# Patient Record
Sex: MALE | Race: WHITE | NOT HISPANIC OR LATINO | URBAN - METROPOLITAN AREA
[De-identification: names, ages, dates, MRNs, and addresses within clinical notes are randomized per-mention and may not be internally consistent; named-entity substitution may affect disease eponyms.]

---

## 2023-07-10 ASSESSMENT — ENCOUNTER SYMPTOMS
COLOR CHANGE: 0
FACIAL SWELLING: 0
COUGH: 0
FEVER: 0
WHEEZING: 0
CONFUSION: 0
PALPITATIONS: 0
DIARRHEA: 0
ARTHRALGIAS: 0
CONSTIPATION: 0
EYE DISCHARGE: 0
ACTIVITY CHANGE: 0
CHILLS: 0
APPETITE CHANGE: 0

## 2023-07-10 NOTE — PROGRESS NOTES
"Subjective   Patient ID: Humphrey Villarreal is a 43 y.o. male who presents for New patient to establish .    HPI   Patient presents to [facility].  Patient reports that he has been having sinus pressure congestion and drainage.  Wife is sick with similar symptoms.    Fam Hx  Mom () living,   Dad () living, hypothyroidism, pacer, dehydration, HTN, esophageal varices banded, GERD  Sister () living, GERD    Exercise walks, golfs  ETOH once a week  Caffeine two 8 oz cups/day, rarely a soda  Tobacco denies, smoked social as a kid    Colonoscopy Dr. Quick 2023    Past Med Hx  Asthma  GERD   Hypertension   Allergic rhinitis    Past Surg Hx  Lumbar fusion  Intuessception as a toddler    Patient denies other complaints.    Review of Systems   Constitutional:  Negative for activity change, appetite change, chills and fever.   HENT:  Negative for congestion, ear pain and facial swelling.    Eyes:  Negative for discharge.   Respiratory:  Negative for cough and wheezing.    Cardiovascular:  Negative for chest pain, palpitations and leg swelling.   Gastrointestinal:  Negative for constipation and diarrhea.   Musculoskeletal:  Negative for arthralgias.   Skin:  Negative for color change and pallor.   Neurological:  Negative for syncope.   Psychiatric/Behavioral:  Negative for confusion.        Objective   /86 (BP Location: Left arm)   Pulse 89   Ht 1.822 m (5' 11.75\")   Wt 117 kg (258 lb 12.8 oz)   BMI 35.34 kg/m²   BSA Body surface area is 2.43 meters squared.      Physical Exam  Constitutional:       General: He is not in acute distress.     Appearance: Normal appearance. He is not toxic-appearing.   HENT:      Head: Normocephalic.      Right Ear: Tympanic membrane, ear canal and external ear normal.      Left Ear: Tympanic membrane, ear canal and external ear normal.      Nose: Nose normal.      Mouth/Throat:      Pharynx: Oropharynx is clear.   Eyes:      Conjunctiva/sclera: Conjunctivae normal.      Pupils: Pupils are " equal, round, and reactive to light.   Cardiovascular:      Rate and Rhythm: Normal rate and regular rhythm.      Pulses: Normal pulses.      Heart sounds: Normal heart sounds.   Pulmonary:      Effort: No respiratory distress.      Breath sounds: No wheezing, rhonchi or rales.   Abdominal:      General: Bowel sounds are normal. There is no distension.      Palpations: Abdomen is soft.      Tenderness: There is no abdominal tenderness.   Musculoskeletal:         General: No swelling or tenderness.      Cervical back: No tenderness.   Skin:     Findings: No lesion or rash.   Neurological:      General: No focal deficit present.      Mental Status: He is alert and oriented to person, place, and time. Mental status is at baseline.      Gait: Gait normal.   Psychiatric:         Mood and Affect: Mood normal.         Behavior: Behavior normal.         Thought Content: Thought content normal.         Judgment: Judgment normal.       No visits with results within 1 Year(s) from this visit.   Latest known visit with results is:   [facility] Encounter on 08/19/2015   Component Date Value Ref Range Status    Ventricular Rate 08/19/2015 90  BPM Final    Atrial Rate 08/19/2015 90  BPM Final    IA Interval 08/19/2015 148  ms Final    QRS Duration 08/19/2015 90  ms Final    QT Interval 08/19/2015 354  ms Final    QTC Calculation(Bazett) 08/19/2015 433  ms Final    P Axis 08/19/2015 33  degrees Final    R Axis 08/19/2015 43  degrees Final    T Axis 08/19/2015 35  degrees Final    QRS Count 08/19/2015 15  beats Final    Q Onset 08/19/2015 216  ms Final    P Onset 08/19/2015 142  ms Final    P Offset 08/19/2015 199  ms Final    T Offset 08/19/2015 393  ms Final    QTC Fredericia 08/19/2015 405  ms Final     Current Outpatient Medications on File Prior to Visit   Medication Sig Dispense Refill    esomeprazole (NexIUM) 40 mg DR capsule Take 1 capsule (40 mg) by mouth once daily in the morning. Take before meals. Do not open capsule.       metoprolol succinate XL (Toprol-XL) 100 mg 24 hr tablet Take 1 tablet (100 mg) by mouth once daily.      triamterene-hydrochlorothiazid (Maxzide-25) 37.5-25 mg tablet TAKE 1 TABLET BY MOUTH IN THE MORNING ONCE DAILY      valsartan (Diovan) 160 mg tablet        No current facility-administered medications on file prior to visit.     No images are attached to the encounter.            Assessment/Plan   Diagnoses and all orders for this visit:  Acute non-recurrent frontal sinusitis  -     azithromycin (Zithromax) 250 mg tablet; Take 2 tablets (500 mg) by mouth once daily for 1 day, THEN 1 tablet (250 mg) once daily for 4 days. Take 2 tabs (500 mg) by mouth today, than 1 daily for 4 days..  Asthma, unspecified asthma severity, unspecified whether complicated, unspecified whether persistent  Gastroesophageal reflux disease without esophagitis  Primary hypertension  Allergic rhinitis, unspecified seasonality, unspecified trigger    Patient have additional blood work performed  Patient to schedule physical exam at his earliest convenience  Patient to call for questions or concerns

## 2023-07-11 ENCOUNTER — OFFICE VISIT (OUTPATIENT)
Dept: PRIMARY CARE | Facility: CLINIC | Age: 43
End: 2023-07-11
Payer: COMMERCIAL

## 2023-07-11 VITALS
HEIGHT: 72 IN | HEART RATE: 89 BPM | BODY MASS INDEX: 35.05 KG/M2 | WEIGHT: 258.8 LBS | DIASTOLIC BLOOD PRESSURE: 86 MMHG | SYSTOLIC BLOOD PRESSURE: 140 MMHG

## 2023-07-11 DIAGNOSIS — I10 PRIMARY HYPERTENSION: ICD-10-CM

## 2023-07-11 DIAGNOSIS — Z00.00 HEALTHCARE MAINTENANCE: ICD-10-CM

## 2023-07-11 DIAGNOSIS — J45.909 ASTHMA, UNSPECIFIED ASTHMA SEVERITY, UNSPECIFIED WHETHER COMPLICATED, UNSPECIFIED WHETHER PERSISTENT (HHS-HCC): ICD-10-CM

## 2023-07-11 DIAGNOSIS — K86.2 PANCREATIC CYST (HHS-HCC): ICD-10-CM

## 2023-07-11 DIAGNOSIS — K76.0 HEPATIC STEATOSIS: ICD-10-CM

## 2023-07-11 DIAGNOSIS — K21.9 GASTROESOPHAGEAL REFLUX DISEASE WITHOUT ESOPHAGITIS: ICD-10-CM

## 2023-07-11 DIAGNOSIS — J01.10 ACUTE NON-RECURRENT FRONTAL SINUSITIS: Primary | ICD-10-CM

## 2023-07-11 DIAGNOSIS — J30.9 ALLERGIC RHINITIS, UNSPECIFIED SEASONALITY, UNSPECIFIED TRIGGER: ICD-10-CM

## 2023-07-11 DIAGNOSIS — R10.9 ABDOMINAL PAIN, UNSPECIFIED ABDOMINAL LOCATION: ICD-10-CM

## 2023-07-11 PROCEDURE — 3077F SYST BP >= 140 MM HG: CPT | Performed by: FAMILY MEDICINE

## 2023-07-11 PROCEDURE — 1036F TOBACCO NON-USER: CPT | Performed by: FAMILY MEDICINE

## 2023-07-11 PROCEDURE — 3079F DIAST BP 80-89 MM HG: CPT | Performed by: FAMILY MEDICINE

## 2023-07-11 PROCEDURE — 99204 OFFICE O/P NEW MOD 45 MIN: CPT | Performed by: FAMILY MEDICINE

## 2023-07-11 RX ORDER — AZITHROMYCIN 250 MG/1
TABLET, FILM COATED ORAL
Qty: 6 TABLET | Refills: 0 | Status: SHIPPED | OUTPATIENT
Start: 2023-07-11 | End: 2023-07-16

## 2023-07-11 RX ORDER — METOPROLOL SUCCINATE 100 MG/1
100 TABLET, EXTENDED RELEASE ORAL DAILY
COMMUNITY
Start: 2023-06-06 | End: 2024-02-13 | Stop reason: SDUPTHER

## 2023-07-11 RX ORDER — TRIAMTERENE/HYDROCHLOROTHIAZID 37.5-25 MG
TABLET ORAL
COMMUNITY
Start: 2023-06-15 | End: 2024-02-13 | Stop reason: SDUPTHER

## 2023-07-11 RX ORDER — VALSARTAN 160 MG/1
TABLET ORAL
COMMUNITY
Start: 2023-06-07 | End: 2024-02-13 | Stop reason: SDUPTHER

## 2023-07-11 RX ORDER — ESOMEPRAZOLE MAGNESIUM 40 MG/1
40 CAPSULE, DELAYED RELEASE ORAL
COMMUNITY

## 2023-08-03 ENCOUNTER — LAB (OUTPATIENT)
Dept: LAB | Facility: LAB | Age: 43
End: 2023-08-03
Payer: COMMERCIAL

## 2023-08-03 DIAGNOSIS — Z00.00 HEALTHCARE MAINTENANCE: ICD-10-CM

## 2023-08-03 DIAGNOSIS — R73.01 ELEVATED FASTING GLUCOSE: ICD-10-CM

## 2023-08-03 DIAGNOSIS — R10.9 ABDOMINAL PAIN, UNSPECIFIED ABDOMINAL LOCATION: ICD-10-CM

## 2023-08-03 DIAGNOSIS — D69.6 THROMBOCYTOPENIA (CMS-HCC): ICD-10-CM

## 2023-08-03 DIAGNOSIS — J30.9 ALLERGIC RHINITIS, UNSPECIFIED SEASONALITY, UNSPECIFIED TRIGGER: ICD-10-CM

## 2023-08-03 LAB
ALANINE AMINOTRANSFERASE (SGPT) (U/L) IN SER/PLAS: 42 U/L (ref 10–52)
ALBUMIN (G/DL) IN SER/PLAS: 4.5 G/DL (ref 3.4–5)
ALKALINE PHOSPHATASE (U/L) IN SER/PLAS: 47 U/L (ref 33–120)
AMYLASE (U/L) IN SER/PLAS: 26 U/L (ref 29–103)
ANION GAP IN SER/PLAS: 13 MMOL/L (ref 10–20)
ASPARTATE AMINOTRANSFERASE (SGOT) (U/L) IN SER/PLAS: 26 U/L (ref 9–39)
BASOPHILS (10*3/UL) IN BLOOD BY AUTOMATED COUNT: 0.03 X10E9/L (ref 0–0.1)
BASOPHILS/100 LEUKOCYTES IN BLOOD BY AUTOMATED COUNT: 0.6 % (ref 0–2)
BILIRUBIN TOTAL (MG/DL) IN SER/PLAS: 0.6 MG/DL (ref 0–1.2)
CALCIUM (MG/DL) IN SER/PLAS: 9.2 MG/DL (ref 8.6–10.6)
CARBON DIOXIDE, TOTAL (MMOL/L) IN SER/PLAS: 30 MMOL/L (ref 21–32)
CHLORIDE (MMOL/L) IN SER/PLAS: 100 MMOL/L (ref 98–107)
CHOLESTEROL (MG/DL) IN SER/PLAS: 145 MG/DL (ref 0–199)
CHOLESTEROL IN HDL (MG/DL) IN SER/PLAS: 32 MG/DL
CHOLESTEROL/HDL RATIO: 4.5
CREATININE (MG/DL) IN SER/PLAS: 0.85 MG/DL (ref 0.5–1.3)
EOSINOPHILS (10*3/UL) IN BLOOD BY AUTOMATED COUNT: 0.1 X10E9/L (ref 0–0.7)
EOSINOPHILS/100 LEUKOCYTES IN BLOOD BY AUTOMATED COUNT: 1.9 % (ref 0–6)
ERYTHROCYTE DISTRIBUTION WIDTH (RATIO) BY AUTOMATED COUNT: 12.4 % (ref 11.5–14.5)
ERYTHROCYTE MEAN CORPUSCULAR HEMOGLOBIN CONCENTRATION (G/DL) BY AUTOMATED: 33.3 G/DL (ref 32–36)
ERYTHROCYTE MEAN CORPUSCULAR VOLUME (FL) BY AUTOMATED COUNT: 95 FL (ref 80–100)
ERYTHROCYTES (10*6/UL) IN BLOOD BY AUTOMATED COUNT: 5 X10E12/L (ref 4.5–5.9)
GFR MALE: >90 ML/MIN/1.73M2
GLUCOSE (MG/DL) IN SER/PLAS: 110 MG/DL (ref 74–99)
HEMATOCRIT (%) IN BLOOD BY AUTOMATED COUNT: 47.4 % (ref 41–52)
HEMOGLOBIN (G/DL) IN BLOOD: 15.8 G/DL (ref 13.5–17.5)
IMMATURE GRANULOCYTES/100 LEUKOCYTES IN BLOOD BY AUTOMATED COUNT: 0.4 % (ref 0–0.9)
LDL: 56 MG/DL (ref 0–99)
LEUKOCYTES (10*3/UL) IN BLOOD BY AUTOMATED COUNT: 5.1 X10E9/L (ref 4.4–11.3)
LIPASE (U/L) IN SER/PLAS: 28 U/L (ref 9–82)
LYMPHOCYTES (10*3/UL) IN BLOOD BY AUTOMATED COUNT: 1.09 X10E9/L (ref 1.2–4.8)
LYMPHOCYTES/100 LEUKOCYTES IN BLOOD BY AUTOMATED COUNT: 21.2 % (ref 13–44)
MONOCYTES (10*3/UL) IN BLOOD BY AUTOMATED COUNT: 0.5 X10E9/L (ref 0.1–1)
MONOCYTES/100 LEUKOCYTES IN BLOOD BY AUTOMATED COUNT: 9.7 % (ref 2–10)
NEUTROPHILS (10*3/UL) IN BLOOD BY AUTOMATED COUNT: 3.4 X10E9/L (ref 1.2–7.7)
NEUTROPHILS/100 LEUKOCYTES IN BLOOD BY AUTOMATED COUNT: 66.2 % (ref 40–80)
NON HDL CHOLESTEROL: 113 MG/DL
NRBC (PER 100 WBCS) BY AUTOMATED COUNT: 0 /100 WBC (ref 0–0)
PLATELETS (10*3/UL) IN BLOOD AUTOMATED COUNT: 146 X10E9/L (ref 150–450)
POTASSIUM (MMOL/L) IN SER/PLAS: 3.7 MMOL/L (ref 3.5–5.3)
PROTEIN TOTAL: 6.7 G/DL (ref 6.4–8.2)
SODIUM (MMOL/L) IN SER/PLAS: 139 MMOL/L (ref 136–145)
THYROTROPIN (MIU/L) IN SER/PLAS BY DETECTION LIMIT <= 0.05 MIU/L: 0.92 MIU/L (ref 0.44–3.98)
TRIGLYCERIDE (MG/DL) IN SER/PLAS: 287 MG/DL (ref 0–149)
UREA NITROGEN (MG/DL) IN SER/PLAS: 16 MG/DL (ref 6–23)
VLDL: 57 MG/DL (ref 0–40)

## 2023-08-03 PROCEDURE — 84443 ASSAY THYROID STIM HORMONE: CPT

## 2023-08-03 PROCEDURE — 86003 ALLG SPEC IGE CRUDE XTRC EA: CPT

## 2023-08-03 PROCEDURE — 82785 ASSAY OF IGE: CPT

## 2023-08-03 PROCEDURE — 85025 COMPLETE CBC W/AUTO DIFF WBC: CPT

## 2023-08-03 PROCEDURE — 36415 COLL VENOUS BLD VENIPUNCTURE: CPT

## 2023-08-03 PROCEDURE — 83690 ASSAY OF LIPASE: CPT

## 2023-08-03 PROCEDURE — 80061 LIPID PANEL: CPT

## 2023-08-03 PROCEDURE — 80053 COMPREHEN METABOLIC PANEL: CPT

## 2023-08-03 PROCEDURE — 82150 ASSAY OF AMYLASE: CPT

## 2023-08-04 LAB
ALLERGEN ANIMAL: CAT DANDER IGE (KU/L): <0.1 KU/L
ALLERGEN ANIMAL: DOG DANDER IGE (KU/L): <0.1 KU/L
ALLERGEN FOOD: CLAM (RUDITAPES SPP.) IGE (KU/L): <0.1 KU/L
ALLERGEN FOOD: EGG WHITE IGE (KU/L): <0.1 KU/L
ALLERGEN FOOD: FISH (COD) GADUS MORHUA) IGE (KU/L): <0.1 KU/L
ALLERGEN FOOD: MAIZE, CORN (ZEA MAYS) IGE (KU/L): <0.1 KU/L
ALLERGEN FOOD: MILK IGE (KU/L): <0.1 KU/L
ALLERGEN FOOD: PEANUT (ARACHIS HYPOGAEA) IGE (KU/L): <0.1 KU/L
ALLERGEN FOOD: SCALLOP (PECTEN SPP.) IGE (KU/L): <0.1 KU/L
ALLERGEN FOOD: SESAME SEED (SESAMUM INDICUM) IGE (KU/L): <0.1 KU/L
ALLERGEN FOOD: SHRIMP (P. BOREALIS/MONODON, M. BARBATA/JOYNERI) IGE (KU/L): <0.1 KU/L
ALLERGEN FOOD: SOYBEAN (GLYCINE MAX) IGE (KU/L): <0.1 KU/L
ALLERGEN FOOD: WALNUT (JUGLANS SPP.) IGE (KU/L): <0.1 KU/L
ALLERGEN FOOD: WHEAT (TRITICUM AESTIVUM) IGE (KU/L): <0.1 KU/L
ALLERGEN GRASS: BERMUDA GRASS (CYNODON DACTYLON) IGE (KU/L): <0.1 KU/L
ALLERGEN GRASS: JOHNSON GRASS (SORGHUM HALEPENSE) IGE (KU/L): <0.1 KU/L
ALLERGEN GRASS: MEADOW GRASS, KENTUCKY BLUE (POA PRATENSIS )IGE (KU/L): <0.1 KU/L
ALLERGEN GRASS: TIMOTHY GRASS (PHLEUM PRATENSE) IGE (KU/L): <0.1 KU/L
ALLERGEN INSECT: COCKROACH IGE: <0.1 KU/L
ALLERGEN MICROORGANISM: ALTERNARIA ALTERNATA IGE (KU/L): <0.1 KU/L
ALLERGEN MICROORGANISM: ASPERGILLUS FUMIGATUS IGE (KU/L): <0.1 KU/L
ALLERGEN MICROORGANISM: CLADOSPORIUM HERBARUM IGE (KU/L): <0.1 KU/L
ALLERGEN MICROORGANISM: PENICILLIUM CHRYSOGENUM (P. NOTATUM) IGE (KU/L): <0.1 KU/L
ALLERGEN MITE: DERMATOPHAGOIDES FARINAE (HOUSE DUST MITE) IGE (KU/L): 0.14 KU/L
ALLERGEN MITE: DERMATOPHAGOIDES PTERONYSSINUS (HOUSE DUST MITE) IGE (KU/L): 0.17 KU/L
ALLERGEN TREE: BOX-ELDER (ACER NEGUNDO) IGE (KU/L): 0.12 KU/L
ALLERGEN TREE: COMMON SILVER BIRCH (BETULA VERRUCOSA) IGE (KU/L): 0.5 KU/L
ALLERGEN TREE: COTTONWOOD (POPULUS DELTOIDES) IGE (KU/L): <0.1 KU/L
ALLERGEN TREE: ELM (ULMUS AMERICANA) IGE (KU/L): <0.1 KU/L
ALLERGEN TREE: MAPLE LEAF SYCAMORE, LONDON PLANE IGE (KU/L): <0.1 KU/L
ALLERGEN TREE: MOUNTAIN JUNIPER (JUNIPERUS SABINOIDES) IGE (KU/L): <0.1 KU/L
ALLERGEN TREE: MULBERRY (MORUS ALBA) IGE (KU/L): <0.1 KU/L
ALLERGEN TREE: OAK (QUERCUS ALBA) IGE (KU/L): 0.41 KU/L
ALLERGEN TREE: PECAN, HICKORY (CARYA PECAN) IGE (KU/L): 0.27 KU/L
ALLERGEN TREE: WALNUT IGE: 0.16 KU/L
ALLERGEN TREE: WHITE ASH (FRAXINUS AMERICANA) IGE (KU/L): <0.1 KU/L
ALLERGEN WEED: COMMON PIGWEED (AMARANTHUS RETROFLEXUS) IGE (KU/L): <0.1 KU/L
ALLERGEN WEED: COMMON RAGWEED (AMB. ARTEMISIIFOLIA/A. ELATIOR) IGE (KU/L): <0.1 KU/L
ALLERGEN WEED: GOOSEFOOT, LAMB'S QUARTERS (CHENOPODIUM ALBUM) IGE (KU/L): <0.1 KU/L
ALLERGEN WEED: PLANTAIN (ENGLISH), RIBWORT (PLANTAGO LANCEOLATA) IGE (KU/L): <0.1 KU/L
ALLERGEN WEED: PRICKLY SALTWORT/RUSSIAN THISTLE (SALSOLA KALI) IGE (KU/L): <0.1 KU/L
ALLERGEN WEED: SHEEP SORREL (RUMEX ACETOSELLA) IGE (KU/L): <0.1 KU/L
IMMUNOCAP IGE: 19.9 KU/L (ref 0–214)
IMMUNOCAP INTERPRETATION: ABNORMAL
IMMUNOCAP INTERPRETATION: NORMAL

## 2023-09-25 ENCOUNTER — TELEMEDICINE (OUTPATIENT)
Dept: PRIMARY CARE | Facility: CLINIC | Age: 43
End: 2023-09-25
Payer: COMMERCIAL

## 2023-09-25 DIAGNOSIS — J06.9 ACUTE URI: Primary | ICD-10-CM

## 2023-09-25 PROCEDURE — 99214 OFFICE O/P EST MOD 30 MIN: CPT | Performed by: FAMILY MEDICINE

## 2023-09-25 RX ORDER — AZITHROMYCIN 250 MG/1
TABLET, FILM COATED ORAL
Qty: 6 TABLET | Refills: 0 | Status: SHIPPED | OUTPATIENT
Start: 2023-09-25 | End: 2023-09-30

## 2023-09-25 ASSESSMENT — ENCOUNTER SYMPTOMS
PALPITATIONS: 0
ACTIVITY CHANGE: 0
COUGH: 0
FACIAL SWELLING: 0
COLOR CHANGE: 0
CONSTIPATION: 0
CHILLS: 1
FEVER: 1
WHEEZING: 0
EYE DISCHARGE: 0
CONFUSION: 0
APPETITE CHANGE: 0
HEADACHES: 1
DIARRHEA: 0
FATIGUE: 1
ARTHRALGIAS: 0
MYALGIAS: 1

## 2023-09-25 NOTE — PROGRESS NOTES
Subjective   Patient ID: Humphrey Villarreal is a 43 y.o. male who presents for Fever with headache.  (Body aches, chills, sweating. X yesterday. Neg for covid today. ).    HPI   Patient reports he has been having difficulty with fever headache body aches chills sweating since yesterday patient reports he took a COVID test and it was negative.  Patient denies any chest pain shortness of breath syncopal episodes or palpitations.  Review of Systems   Constitutional:  Positive for chills, fatigue and fever. Negative for activity change and appetite change.   HENT:  Negative for congestion, ear pain and facial swelling.    Eyes:  Negative for discharge.   Respiratory:  Negative for cough and wheezing.    Cardiovascular:  Negative for chest pain, palpitations and leg swelling.   Gastrointestinal:  Negative for constipation and diarrhea.   Musculoskeletal:  Positive for myalgias. Negative for arthralgias.   Skin:  Negative for color change and pallor.   Neurological:  Positive for headaches. Negative for syncope.   Psychiatric/Behavioral:  Negative for confusion.        Objective   There were no vitals taken for this visit.  BSA There is no height or weight on file to calculate BSA.      Physical Exam  Lab on 08/03/2023   Component Date Value Ref Range Status    Amylase 08/03/2023 26 (L)  29 - 103 U/L Final    Lipase 08/03/2023 28  9 - 82 U/L Final     Venipuncture immediately after or during the    administration of Metamizole may lead to falsely   low results. Testing should be performed immediately   prior to Metamizole dosing.    Clam IgE 08/03/2023 <0.10  <0.35 KU/L Final      SEE IMMUNOCAP INTERP.IGE     Fish (Cod) IgE 08/03/2023 <0.10  <0.35 KU/L Final      SEE IMMUNOCAP INTERP.IGE     [place], Corn IgE 08/03/2023 <0.10  <0.35 KU/L Final      SEE IMMUNOCAP INTERP.IGE     Egg White IgE 08/03/2023 <0.10  <0.35 KU/L Final      SEE IMMUNOCAP INTERP.IGE     Milk IgE 08/03/2023 <0.10  <0.35 KU/L Final      SEE IMMUNOCAP  INTERP.IGE     Peanut IgE 08/03/2023 <0.10  <0.35 KU/L Final      SEE IMMUNOCAP INTERP.IGE     Scallop IgE 08/03/2023 <0.10  <0.35 KU/L Final      SEE IMMUNOCAP INTERP.IGE     Sesame Seed IgE 08/03/2023 <0.10  <0.35 KU/L Final      SEE IMMUNOCAP INTERP.IGE     Shrimp IgE 08/03/2023 <0.10  <0.35 KU/L Final      SEE IMMUNOCAP INTERP.IGE     Soybean IgE 08/03/2023 <0.10  <0.35 KU/L Final      SEE IMMUNOCAP INTERP.IGE     [place] IgE 08/03/2023 <0.10  <0.35 KU/L Final      SEE IMMUNOCAP INTERP.IGE     Wheat IgE 08/03/2023 <0.10  <0.35 KU/L Final      SEE IMMUNOCAP INTERP.IGE     Immunocap Interpretation 08/03/2023 SEE COMMENT   Final         REFERENCE RANGE (IMMUNOCAP) IGE   KU/L           CLASS     INTERPRETATION       <  0.10       0       BELOW DETECTION   0.10-  0.34      0/1      EQUIVOCAL   0.35-  0.69       1       LOW POSITIVE   0.70-  3.49       2       MODERATE POSITIVE   3.50- 17.49       3       HIGH POSITIVE  17.50- 49          4       VERY HIGH POSITIVE  50   - 99          5       VERY HIGH POSITIVE       >100          6       VERY HIGH POSITIVE    Immunocap IgE 08/03/2023 19.9  0.0 - 214.0 KU/L Final     Note:  Omalizumab (Xolair, [employer]; humanized    IgG1 antihuman IgE Fc) treatment does not    significantly interfere with the accuracy of    total IgE on the ImmunoCAP ([employer]) platform.    J Allergy Clin Immunol 2006;117:759-66).   Allergens, parasitic diseases, smoking, and   alcohol consumption have been reported to   increase levels of total IgE in serum.    [place] Grass IgE 08/03/2023 <0.10  <0.35 KU/L Final      SEE IMMUNOCAP INTERP.IGE     [place] Grass IgE 08/03/2023 <0.10  <0.35 KU/L Final      SEE IMMUNOCAP INTERP.IGE     [place] Grass, [place] Blue IgE 08/03/2023 <0.10  <0.35 KU/L Final      SEE IMMUNOCAP INTERP.IGE     [place] Grass IgE 08/03/2023 <0.10  <0.35 KU/L Final      SEE IMMUNOCAP INTERP.IGE     [place], [place] IgE 08/03/2023 <0.10  <0.35 KU/L Final      SEE IMMUNOCAP  INTERP.IGE     Common Pigweed IgE 08/03/2023 <0.10  <0.35 KU/L Final      SEE IMMUNOCAP INTERP.IGE     Common Ragweed IgE 08/03/2023 <0.10  <0.35 KU/L Final      SEE IMMUNOCAP INTERP.IGE     White [place] IgE 08/03/2023 <0.10  <0.35 KU/L Final      SEE IMMUNOCAP INTERP.IGE     Common Silver Birch IgE 08/03/2023 0.50 (A)  <0.35 KU/L Final      SEE IMMUNOCAP INTERP.IGE     Box-Elder IgE 08/03/2023 0.12  <0.35 KU/L Final      SEE IMMUNOCAP INTERP.IGE     [place]ountain Juniper IgE 08/03/2023 <0.10  <0.35 KU/L Final      SEE IMMUNOCAP INTERP.IGE     [place]atee IgE 08/03/2023 <0.10  <0.35 KU/L Final      SEE IMMUNOCAP INTERP.IGE     Elm IgE 08/03/2023 <0.10  <0.35 KU/L Final      SEE IMMUNOCAP INTERP.IGE     Bend IgE 08/03/2023 <0.10  <0.35 KU/L Final      SEE IMMUNOCAP INTERP.IGE     [place] IgE 08/03/2023 0.41 (A)  <0.35 KU/L Final      SEE IMMUNOCAP INTERP.IGE     Pecan, [place] IgE 08/03/2023 0.27  <0.35 KU/L Final      SEE IMMUNOCAP INTERP.IGE     Maple [place], [place] * 08/03/2023 <0.10  <0.35 KU/L Final      SEE IMMUNOCAP INTERP.IGE     [place] Tree IgE 08/03/2023 0.16  <0.35 KU/L Final      SEE IMMUNOCAP INTERP.IGE     Prickly Saltwort/Russian Thistle I* 08/03/2023 <0.10  <0.35 KU/L Final      SEE IMMUNOCAP INTERP.IGE     Sheep Sorrel IgE 08/03/2023 <0.10  <0.35 KU/L Final      SEE IMMUNOCAP INTERP.IGE     Cat Dander IgE 08/03/2023 <0.10  <0.35 KU/L Final      SEE IMMUNOCAP INTERP.IGE     Dog Dander IgE 08/03/2023 <0.10  <0.35 KU/L Final      SEE IMMUNOCAP INTERP.IGE     Alternaria Alternata IgE 08/03/2023 <0.10  <0.35 KU/L Final      SEE IMMUNOCAP INTERP.IGE     Aspergillus Fumigatus IgE 08/03/2023 <0.10  <0.35 KU/L Final      SEE IMMUNOCAP INTERP.IGE     Cladosporium Herbarum IgE 08/03/2023 <0.10  <0.35 KU/L Final      SEE IMMUNOCAP INTERP.IGE     Penicillium Chrysogenum (P. notatu* 08/03/2023 <0.10  <0.35 KU/L Final      SEE IMMUNOCAP INTERP.IGE     Plantain IgE 08/03/2023 <0.10  <0.35 KU/L Final      SEE  IMMUNOCAP INTERP.IGE     Dust Mite (D. farinae) IgE 08/03/2023 0.14  <0.35 KU/L Final      SEE IMMUNOCAP INTERP.IGE     Dust Mite (D. pteronyssinus) IgE 08/03/2023 0.17  <0.35 KU/L Final      SEE IMMUNOCAP INTERP.IGE     [place] Cockroach IgE 08/03/2023 <0.10  <0.35 KU/L Final      SEE IMMUNOCAP INTERP.IGE     Immunocap Interpretation 08/03/2023 SEE COMMENT   Final         REFERENCE RANGE (IMMUNOCAP) IGE   KU/L           CLASS     INTERPRETATION       <  0.10       0       BELOW DETECTION   0.10-  0.34      0/1      EQUIVOCAL   0.35-  0.69       1       LOW POSITIVE   0.70-  3.49       2       MODERATE POSITIVE   3.50- 17.49       3       HIGH POSITIVE  17.50- 49          4       VERY HIGH POSITIVE  50   - 99          5       VERY HIGH POSITIVE       >100          6       VERY HIGH POSITIVE    WBC 08/03/2023 5.1  4.4 - 11.3 x10E9/L Final    nRBC 08/03/2023 0.0  0.0 - 0.0 /100 WBC Final    RBC 08/03/2023 5.00  4.50 - 5.90 x10E12/L Final    Hemoglobin 08/03/2023 15.8  13.5 - 17.5 g/dL Final    Hematocrit 08/03/2023 47.4  41.0 - 52.0 % Final    MCV 08/03/2023 95  80 - 100 fL Final    MCHC 08/03/2023 33.3  32.0 - 36.0 g/dL Final    Platelets 08/03/2023 146 (L)  150 - 450 x10E9/L Final    RDW 08/03/2023 12.4  11.5 - 14.5 % Final    Neutrophils % 08/03/2023 66.2  40.0 - 80.0 % Final    Immature Granulocytes %, Automated 08/03/2023 0.4  0.0 - 0.9 % Final     Immature Granulocyte Count (IG) includes promyelocytes,    myelocytes and metamyelocytes but does not include bands.   Percent differential counts (%) should be interpreted in the   context of the absolute cell counts (cells/L).    Lymphocytes % 08/03/2023 21.2  13.0 - 44.0 % Final    Monocytes % 08/03/2023 9.7  2.0 - 10.0 % Final    Eosinophils % 08/03/2023 1.9  0.0 - 6.0 % Final    Basophils % 08/03/2023 0.6  0.0 - 2.0 % Final    Neutrophils Absolute 08/03/2023 3.40  1.20 - 7.70 x10E9/L Final    Lymphocytes Absolute 08/03/2023 1.09 (L)  1.20 - 4.80 x10E9/L Final     Monocytes Absolute 08/03/2023 0.50  0.10 - 1.00 x10E9/L Final    Eosinophils Absolute 08/03/2023 0.10  0.00 - 0.70 x10E9/L Final    Basophils Absolute 08/03/2023 0.03  0.00 - 0.10 x10E9/L Final    Glucose 08/03/2023 110 (H)  74 - 99 mg/dL Final    Sodium 08/03/2023 139  136 - 145 mmol/L Final    Potassium 08/03/2023 3.7  3.5 - 5.3 mmol/L Final    Chloride 08/03/2023 100  98 - 107 mmol/L Final    Bicarbonate 08/03/2023 30  21 - 32 mmol/L Final    Anion Gap 08/03/2023 13  10 - 20 mmol/L Final    Urea Nitrogen 08/03/2023 16  6 - 23 mg/dL Final    Creatinine 08/03/2023 0.85  0.50 - 1.30 mg/dL Final    GFR MALE 08/03/2023 >90  >90 mL/min/1.73m2 Final     CALCULATIONS OF ESTIMATED GFR ARE PERFORMED   USING THE 2021 CKD-EPI STUDY REFIT EQUATION   WITHOUT THE RACE VARIABLE FOR THE IDMS-TRACEABLE   CREATININE METHODS.    https://jasn.asnjournals.org/content/early/2021/09/22/ASN.7604981275    Calcium 08/03/2023 9.2  8.6 - 10.6 mg/dL Final    Albumin 08/03/2023 4.5  3.4 - 5.0 g/dL Final    Alkaline Phosphatase 08/03/2023 47  33 - 120 U/L Final    Total Protein 08/03/2023 6.7  6.4 - 8.2 g/dL Final    AST 08/03/2023 26  9 - 39 U/L Final    Total Bilirubin 08/03/2023 0.6  0.0 - 1.2 mg/dL Final    ALT (SGPT) 08/03/2023 42  10 - 52 U/L Final     Patients treated with Sulfasalazine may generate    falsely decreased results for ALT.    Cholesterol 08/03/2023 145  0 - 199 mg/dL Final    .      AGE      DESIRABLE   BORDERLINE HIGH   HIGH     0-19 Y     0 - 169       170 - 199     >/= 200    20-24 Y     0 - 189       190 - 224     >/= 225         >24 Y     0 - 199       200 - 239     >/= 240   **All ranges are based on fasting samples. Specific   therapeutic targets will vary based on patient-specific   cardiac risk.  .   Pediatric guidelines reference:Pediatrics 2011, 128(S5).   Adult guidelines reference: NCEP ATPIII Guidelines,     JOHANNY 2001, 258:2486-97  .   Venipuncture immediately after or during the    administration of  Metamizole may lead to falsely   low results. Testing should be performed immediately   prior to Metamizole dosing.    HDL 08/03/2023 32.0 (A)  mg/dL Final    .      AGE      VERY LOW   LOW     NORMAL    HIGH       0-19 Y       < 35   < 40     40-45     ----    20-24 Y       ----   < 40       >45     ----      >24 Y       ----   < 40     40-60      >60  .    Cholesterol/HDL Ratio 08/03/2023 4.5   Final    REF VALUES  DESIRABLE  < 3.4  HIGH RISK  > 5.0    LDL 08/03/2023 56  0 - 99 mg/dL Final    .                           NEAR      BORD      AGE      DESIRABLE  OPTIMAL    HIGH     HIGH     VERY HIGH     0-19 Y     0 - 109     ---    110-129   >/= 130     ----    20-24 Y     0 - 119     ---    120-159   >/= 160     ----      >24 Y     0 -  99   100-129  130-159   160-189     >/=190  .    VLDL 08/03/2023 57 (H)  0 - 40 mg/dL Final    Triglycerides 08/03/2023 287 (H)  0 - 149 mg/dL Final    .      AGE      DESIRABLE   BORDERLINE HIGH   HIGH     VERY HIGH   0 D-90 D    19 - 174         ----         ----        ----  91 D- 9 Y     0 -  74        75 -  99     >/= 100      ----    10-19 Y     0 -  89        90 - 129     >/= 130      ----    20-24 Y     0 - 114       115 - 149     >/= 150      ----         >24 Y     0 - 149       150 - 199    200- 499    >/= 500  .   Venipuncture immediately after or during the    administration of Metamizole may lead to falsely   low results. Testing should be performed immediately   prior to Metamizole dosing.    Non HDL Cholesterol 08/03/2023 113  mg/dL Final        AGE      DESIRABLE   BORDERLINE HIGH   HIGH     VERY HIGH     0-19 Y     0 - 119       120 - 144     >/= 145    >/= 160    20-24 Y     0 - 149       150 - 189     >/= 190      ----         >24 Y    30 MG/DL ABOVE LDL CHOLESTEROL GOAL  .    TSH 08/03/2023 0.92  0.44 - 3.98 mIU/L Final     TSH testing is performed using different testing    methodology at [facility] than at other    [facility]. Direct result  comparisons should    only be made within the same method.     Current Outpatient Medications on File Prior to Visit   Medication Sig Dispense Refill    esomeprazole (NexIUM) 40 mg DR capsule Take 1 capsule (40 mg) by mouth once daily in the morning. Take before meals. Do not open capsule.      metoprolol succinate XL (Toprol-XL) 100 mg 24 hr tablet Take 1 tablet (100 mg) by mouth once daily.      triamterene-hydrochlorothiazid (Maxzide-25) 37.5-25 mg tablet TAKE 1 TABLET BY MOUTH IN THE MORNING ONCE DAILY      valsartan (Diovan) 160 mg tablet        No current facility-administered medications on file prior to visit.     No images are attached to the encounter.            Assessment/Plan   Diagnoses and all orders for this visit:  Acute URI    1.  Patient to start on antibiotics  2.  Patient to call if questions or concerns

## 2023-09-26 ENCOUNTER — TELEPHONE (OUTPATIENT)
Dept: PRIMARY CARE | Facility: CLINIC | Age: 43
End: 2023-09-26
Payer: COMMERCIAL

## 2023-09-26 NOTE — TELEPHONE ENCOUNTER
Pt symptoms are worsening headaches are really bad he tested negative for covid? Please advise     [phone]

## 2024-01-15 ENCOUNTER — OFFICE VISIT (OUTPATIENT)
Dept: PRIMARY CARE | Facility: CLINIC | Age: 44
End: 2024-01-15
Payer: COMMERCIAL

## 2024-01-15 VITALS
SYSTOLIC BLOOD PRESSURE: 127 MMHG | RESPIRATION RATE: 16 BRPM | WEIGHT: 269 LBS | BODY MASS INDEX: 35.65 KG/M2 | DIASTOLIC BLOOD PRESSURE: 82 MMHG | HEIGHT: 73 IN | TEMPERATURE: 97.9 F | OXYGEN SATURATION: 95 %

## 2024-01-15 DIAGNOSIS — J06.9 VIRAL URI: Primary | ICD-10-CM

## 2024-01-15 PROCEDURE — 1036F TOBACCO NON-USER: CPT | Performed by: NURSE PRACTITIONER

## 2024-01-15 PROCEDURE — 99213 OFFICE O/P EST LOW 20 MIN: CPT | Performed by: NURSE PRACTITIONER

## 2024-01-15 PROCEDURE — 87637 SARSCOV2&INF A&B&RSV AMP PRB: CPT

## 2024-01-15 RX ORDER — FLUTICASONE PROPIONATE 50 MCG
2 SPRAY, SUSPENSION (ML) NASAL DAILY
Qty: 16 G | Refills: 1 | Status: SHIPPED | OUTPATIENT
Start: 2024-01-15

## 2024-01-15 ASSESSMENT — ENCOUNTER SYMPTOMS
SHORTNESS OF BREATH: 0
OCCASIONAL FEELINGS OF UNSTEADINESS: 0
FEVER: 0
WHEEZING: 0
NAUSEA: 0
DIARRHEA: 0
SINUS PRESSURE: 0
COUGH: 1
RHINORRHEA: 1
DEPRESSION: 0
ABDOMINAL PAIN: 0
LOSS OF SENSATION IN FEET: 0
SINUS PAIN: 0
VOMITING: 0
CHILLS: 0

## 2024-01-15 ASSESSMENT — PATIENT HEALTH QUESTIONNAIRE - PHQ9
SUM OF ALL RESPONSES TO PHQ9 QUESTIONS 1 AND 2: 0
2. FEELING DOWN, DEPRESSED OR HOPELESS: NOT AT ALL
10. IF YOU CHECKED OFF ANY PROBLEMS, HOW DIFFICULT HAVE THESE PROBLEMS MADE IT FOR YOU TO DO YOUR WORK, TAKE CARE OF THINGS AT HOME, OR GET ALONG WITH OTHER PEOPLE: NOT DIFFICULT AT ALL
1. LITTLE INTEREST OR PLEASURE IN DOING THINGS: NOT AT ALL

## 2024-01-15 NOTE — ASSESSMENT & PLAN NOTE
RSV, COVID, and flu swab   Advised patient to push fluids and start use of cool mist humidifier  Patient to start using flonase nasal spray  Patient to call if develop new or worsening symptom

## 2024-01-15 NOTE — PATIENT INSTRUCTIONS
We will call you with the results of your viral swab   Advised patient to push fluids and start use of cool mist humidifier  Patient to start using flonase nasal spray  Patient to call if develop new or worsening symptoms

## 2024-01-15 NOTE — PROGRESS NOTES
"Subjective   Chief Complaint: sinus drainage (Started 2 days ago), watery eyes, Cough (Dry;  started this morning), and sneezing.    HPI   Humphrey Villarreal is a 43 y.o. male who presents for sinus drainage (Started 2 days ago), watery eyes, Cough (Dry;  started this morning), and sneezing.    Patient present with  nasal congestion, water eyes, dry cough, sneezing, post nasal drip     OTC allergy and cold medicine     Patient denies fever, chills, nausea, vomiting, diarrhea, chest pain, or shortness of breath.     COVID test neg yesterday at home     Review of Systems   Constitutional:  Negative for chills and fever.   HENT:  Positive for congestion, postnasal drip, rhinorrhea and sneezing. Negative for sinus pressure and sinus pain.    Respiratory:  Positive for cough. Negative for shortness of breath and wheezing.    Cardiovascular:  Negative for chest pain.   Gastrointestinal:  Negative for abdominal pain, diarrhea, nausea and vomiting.       Objective   /82 (BP Location: Left arm, Patient Position: Sitting, BP Cuff Size: Adult)   Temp 36.6 °C (97.9 °F)   Resp 16   Ht 1.854 m (6' 1\")   Wt 122 kg (269 lb)   SpO2 95%   BMI 35.49 kg/m²   BSA Body surface area is 2.51 meters squared.      Physical Exam  Constitutional:       Appearance: Normal appearance.   HENT:      Right Ear: Tympanic membrane normal.      Left Ear: Tympanic membrane normal.      Nose: Congestion present.      Mouth/Throat:      Mouth: Mucous membranes are moist.   Eyes:      Pupils: Pupils are equal, round, and reactive to light.   Cardiovascular:      Rate and Rhythm: Normal rate and regular rhythm.   Pulmonary:      Effort: Pulmonary effort is normal.      Breath sounds: Normal breath sounds.   Abdominal:      General: Abdomen is flat.      Palpations: Abdomen is soft.   Neurological:      Mental Status: He is alert.       Lab on 08/03/2023   Component Date Value Ref Range Status    Amylase 08/03/2023 26 (L)  29 - 103 U/L Final    " Lipase 08/03/2023 28  9 - 82 U/L Final     Venipuncture immediately after or during the    administration of Metamizole may lead to falsely   low results. Testing should be performed immediately   prior to Metamizole dosing.    Clam IgE 08/03/2023 <0.10  <0.35 KU/L Final      SEE IMMUNOCAP INTERP.IGE     Fish (Cod) IgE 08/03/2023 <0.10  <0.35 KU/L Final      SEE IMMUNOCAP INTERP.IGE     [place], Corn IgE 08/03/2023 <0.10  <0.35 KU/L Final      SEE IMMUNOCAP INTERP.IGE     Egg White IgE 08/03/2023 <0.10  <0.35 KU/L Final      SEE IMMUNOCAP INTERP.IGE     Milk IgE 08/03/2023 <0.10  <0.35 KU/L Final      SEE IMMUNOCAP INTERP.IGE     Peanut IgE 08/03/2023 <0.10  <0.35 KU/L Final      SEE IMMUNOCAP INTERP.IGE     Scallop IgE 08/03/2023 <0.10  <0.35 KU/L Final      SEE IMMUNOCAP INTERP.IGE     Sesame Seed IgE 08/03/2023 <0.10  <0.35 KU/L Final      SEE IMMUNOCAP INTERP.IGE     Shrimp IgE 08/03/2023 <0.10  <0.35 KU/L Final      SEE IMMUNOCAP INTERP.IGE     Soybean IgE 08/03/2023 <0.10  <0.35 KU/L Final      SEE IMMUNOCAP INTERP.IGE     [place] IgE 08/03/2023 <0.10  <0.35 KU/L Final      SEE IMMUNOCAP INTERP.IGE     Wheat IgE 08/03/2023 <0.10  <0.35 KU/L Final      SEE IMMUNOCAP INTERP.IGE     Immunocap Interpretation 08/03/2023 SEE COMMENT   Final         REFERENCE RANGE (IMMUNOCAP) IGE   KU/L           CLASS     INTERPRETATION       <  0.10       0       BELOW DETECTION   0.10-  0.34      0/1      EQUIVOCAL   0.35-  0.69       1       LOW POSITIVE   0.70-  3.49       2       MODERATE POSITIVE   3.50- 17.49       3       HIGH POSITIVE  17.50- 49          4       VERY HIGH POSITIVE  50   - 99          5       VERY HIGH POSITIVE       >100          6       VERY HIGH POSITIVE    Immunocap IgE 08/03/2023 19.9  0.0 - 214.0 KU/L Final     Note:  Omalizumab (Xolair, [employer]; humanized    IgG1 antihuman IgE Fc) treatment does not    significantly interfere with the accuracy of    total IgE on the ImmunoCAP (Expertcloud.de) platform.    J  Allergy Clin Immunol 2006;117:759-66).   Allergens, parasitic diseases, smoking, and   alcohol consumption have been reported to   increase levels of total IgE in serum.    [place] Grass IgE 08/03/2023 <0.10  <0.35 KU/L Final      SEE IMMUNOCAP INTERP.IGE     [place] IgE 08/03/2023 <0.10  <0.35 KU/L Final      SEE IMMUNOCAP INTERP.IGE     [place] Grass, [place] Blue IgE 08/03/2023 <0.10  <0.35 KU/L Final      SEE IMMUNOCAP INTERP.IGE     [place] Grass IgE 08/03/2023 <0.10  <0.35 KU/L Final      SEE IMMUNOCAP INTERP.IGE     [place], [place] IgE 08/03/2023 <0.10  <0.35 KU/L Final      SEE IMMUNOCAP INTERP.IGE     Common Pigweed IgE 08/03/2023 <0.10  <0.35 KU/L Final      SEE IMMUNOCAP INTERP.IGE     Common Ragweed IgE 08/03/2023 <0.10  <0.35 KU/L Final      SEE IMMUNOCAP INTERP.IGE     White [place] IgE 08/03/2023 <0.10  <0.35 KU/L Final      SEE IMMUNOCAP INTERP.IGE     Common Silver Birch IgE 08/03/2023 0.50 (A)  <0.35 KU/L Final      SEE IMMUNOCAP INTERP.IGE     Box-Elder IgE 08/03/2023 0.12  <0.35 KU/L Final      SEE IMMUNOCAP INTERP.IGE     [place] Juniper IgE 08/03/2023 <0.10  <0.35 KU/L Final      SEE IMMUNOCAP INTERP.IGE     [place] IgE 08/03/2023 <0.10  <0.35 KU/L Final      SEE IMMUNOCAP INTERP.IGE     [place] IgE 08/03/2023 <0.10  <0.35 KU/L Final      SEE IMMUNOCAP INTERP.IGE     [place] IgE 08/03/2023 <0.10  <0.35 KU/L Final      SEE IMMUNOCAP INTERP.IGE     [place] IgE 08/03/2023 0.41 (A)  <0.35 KU/L Final      SEE IMMUNOCAP INTERP.IGE     Pec[place], [place] IgE 08/03/2023 0.27  <0.35 KU/L Final      SEE IMMUNOCAP INTERP.IGE     [place], [place] * 08/03/2023 <0.10  <0.35 KU/L Final      SEE IMMUNOCAP INTERP.IGE     [place] Tree IgE 08/03/2023 0.16  <0.35 KU/L Final      SEE IMMUNOCAP INTERP.IGE     Prickly Saltwort/Russian Thistle I* 08/03/2023 <0.10  <0.35 KU/L Final      SEE IMMUNOCAP INTERP.IGE     Sheep Sorrel IgE 08/03/2023 <0.10  <0.35 KU/L Final      SEE IMMUNOCAP INTERP.IGE      Cat Dander IgE 08/03/2023 <0.10  <0.35 KU/L Final      SEE IMMUNOCAP INTERP.IGE     Dog Dander IgE 08/03/2023 <0.10  <0.35 KU/L Final      SEE IMMUNOCAP INTERP.IGE     Alternaria Alternata IgE 08/03/2023 <0.10  <0.35 KU/L Final      SEE IMMUNOCAP INTERP.IGE     Aspergillus Fumigatus IgE 08/03/2023 <0.10  <0.35 KU/L Final      SEE IMMUNOCAP INTERP.IGE     Cladosporium Herbarum IgE 08/03/2023 <0.10  <0.35 KU/L Final      SEE IMMUNOCAP INTERP.IGE     Penicillium Chrysogenum (P. notatu* 08/03/2023 <0.10  <0.35 KU/L Final      SEE IMMUNOCAP INTERP.IGE     Pl[place] IgE 08/03/2023 <0.10  <0.35 KU/L Final      SEE IMMUNOCAP INTERP.IGE     Dust Mite (D. farinae) IgE 08/03/2023 0.14  <0.35 KU/L Final      SEE IMMUNOCAP INTERP.IGE     Dust Mite (D. pteronyssinus) IgE 08/03/2023 0.17  <0.35 KU/L Final      SEE IMMUNOCAP INTERP.IGE     [place] Cockroach IgE 08/03/2023 <0.10  <0.35 KU/L Final      SEE IMMUNOCAP INTERP.IGE     Immunocap Interpretation 08/03/2023 SEE COMMENT   Final         REFERENCE RANGE (IMMUNOCAP) IGE   KU/L           CLASS     INTERPRETATION       <  0.10       0       BELOW DETECTION   0.10-  0.34      0/1      EQUIVOCAL   0.35-  0.69       1       LOW POSITIVE   0.70-  3.49       2       MODERATE POSITIVE   3.50- 17.49       3       HIGH POSITIVE  17.50- 49          4       VERY HIGH POSITIVE  50   - 99          5       VERY HIGH POSITIVE       >100          6       VERY HIGH POSITIVE    WBC 08/03/2023 5.1  4.4 - 11.3 x10E9/L Final    nRBC 08/03/2023 0.0  0.0 - 0.0 /100 WBC Final    RBC 08/03/2023 5.00  4.50 - 5.90 x10E12/L Final    Hemoglobin 08/03/2023 15.8  13.5 - 17.5 g/dL Final    Hematocrit 08/03/2023 47.4  41.0 - 52.0 % Final    MCV 08/03/2023 95  80 - 100 fL Final    MCHC 08/03/2023 33.3  32.0 - 36.0 g/dL Final    Platelets 08/03/2023 146 (L)  150 - 450 x10E9/L Final    RDW 08/03/2023 12.4  11.5 - 14.5 % Final    Neutrophils % 08/03/2023 66.2  40.0 - 80.0 % Final    Immature Granulocytes %,  Automated 08/03/2023 0.4  0.0 - 0.9 % Final     Immature Granulocyte Count (IG) includes promyelocytes,    myelocytes and metamyelocytes but does not include bands.   Percent differential counts (%) should be interpreted in the   context of the absolute cell counts (cells/L).    Lymphocytes % 08/03/2023 21.2  13.0 - 44.0 % Final    Monocytes % 08/03/2023 9.7  2.0 - 10.0 % Final    Eosinophils % 08/03/2023 1.9  0.0 - 6.0 % Final    Basophils % 08/03/2023 0.6  0.0 - 2.0 % Final    Neutrophils Absolute 08/03/2023 3.40  1.20 - 7.70 x10E9/L Final    Lymphocytes Absolute 08/03/2023 1.09 (L)  1.20 - 4.80 x10E9/L Final    Monocytes Absolute 08/03/2023 0.50  0.10 - 1.00 x10E9/L Final    Eosinophils Absolute 08/03/2023 0.10  0.00 - 0.70 x10E9/L Final    Basophils Absolute 08/03/2023 0.03  0.00 - 0.10 x10E9/L Final    Glucose 08/03/2023 110 (H)  74 - 99 mg/dL Final    Sodium 08/03/2023 139  136 - 145 mmol/L Final    Potassium 08/03/2023 3.7  3.5 - 5.3 mmol/L Final    Chloride 08/03/2023 100  98 - 107 mmol/L Final    Bicarbonate 08/03/2023 30  21 - 32 mmol/L Final    Anion Gap 08/03/2023 13  10 - 20 mmol/L Final    Urea Nitrogen 08/03/2023 16  6 - 23 mg/dL Final    Creatinine 08/03/2023 0.85  0.50 - 1.30 mg/dL Final    GFR MALE 08/03/2023 >90  >90 mL/min/1.73m2 Final     CALCULATIONS OF ESTIMATED GFR ARE PERFORMED   USING THE 2021 CKD-EPI STUDY REFIT EQUATION   WITHOUT THE RACE VARIABLE FOR THE IDMS-TRACEABLE   CREATININE METHODS.    https://jasn.asnjournals.org/content/early/2021/09/22/ASN.8783453628    Calcium 08/03/2023 9.2  8.6 - 10.6 mg/dL Final    Albumin 08/03/2023 4.5  3.4 - 5.0 g/dL Final    Alkaline Phosphatase 08/03/2023 47  33 - 120 U/L Final    Total Protein 08/03/2023 6.7  6.4 - 8.2 g/dL Final    AST 08/03/2023 26  9 - 39 U/L Final    Total Bilirubin 08/03/2023 0.6  0.0 - 1.2 mg/dL Final    ALT (SGPT) 08/03/2023 42  10 - 52 U/L Final     Patients treated with Sulfasalazine may generate    falsely decreased results  for ALT.    Cholesterol 08/03/2023 145  0 - 199 mg/dL Final    .      AGE      DESIRABLE   BORDERLINE HIGH   HIGH     0-19 Y     0 - 169       170 - 199     >/= 200    20-24 Y     0 - 189       190 - 224     >/= 225         >24 Y     0 - 199       200 - 239     >/= 240   **All ranges are based on fasting samples. Specific   therapeutic targets will vary based on patient-specific   cardiac risk.  .   Pediatric guidelines reference:Pediatrics 2011, 128(S5).   Adult guidelines reference: NCEP ATPIII Guidelines,     JOHANNY 2001, 258:2486-97  .   Venipuncture immediately after or during the    administration of Metamizole may lead to falsely   low results. Testing should be performed immediately   prior to Metamizole dosing.    HDL 08/03/2023 32.0 (A)  mg/dL Final    .      AGE      VERY LOW   LOW     NORMAL    HIGH       0-19 Y       < 35   < 40     40-45     ----    20-24 Y       ----   < 40       >45     ----      >24 Y       ----   < 40     40-60      >60  .    Cholesterol/HDL Ratio 08/03/2023 4.5   Final    REF VALUES  DESIRABLE  < 3.4  HIGH RISK  > 5.0    LDL 08/03/2023 56  0 - 99 mg/dL Final    .                           NEAR      BORD      AGE      DESIRABLE  OPTIMAL    HIGH     HIGH     VERY HIGH     0-19 Y     0 - 109     ---    110-129   >/= 130     ----    20-24 Y     0 - 119     ---    120-159   >/= 160     ----      >24 Y     0 -  99   100-129  130-159   160-189     >/=190  .    VLDL 08/03/2023 57 (H)  0 - 40 mg/dL Final    Triglycerides 08/03/2023 287 (H)  0 - 149 mg/dL Final    .      AGE      DESIRABLE   BORDERLINE HIGH   HIGH     VERY HIGH   0 D-90 D    19 - 174         ----         ----        ----  91 D- 9 Y     0 -  74        75 -  99     >/= 100      ----    10-19 Y     0 -  89        90 - 129     >/= 130      ----    20-24 Y     0 - 114       115 - 149     >/= 150      ----         >24 Y     0 - 149       150 - 199    200- 499    >/= 500  .   Venipuncture immediately after or during the     administration of Metamizole may lead to falsely   low results. Testing should be performed immediately   prior to Metamizole dosing.    Non HDL Cholesterol 08/03/2023 113  mg/dL Final        AGE      DESIRABLE   BORDERLINE HIGH   HIGH     VERY HIGH     0-19 Y     0 - 119       120 - 144     >/= 145    >/= 160    20-24 Y     0 - 149       150 - 189     >/= 190      ----         >24 Y    30 MG/DL ABOVE LDL CHOLESTEROL GOAL  .    TSH 08/03/2023 0.92  0.44 - 3.98 mIU/L Final     TSH testing is performed using different testing    methodology at [facility] than at other    [facility]. Direct result comparisons should    only be made within the same method.     Current Outpatient Medications on File Prior to Visit   Medication Sig Dispense Refill    esomeprazole (NexIUM) 40 mg DR capsule Take 1 capsule (40 mg) by mouth once daily in the morning. Take before meals. Do not open capsule.      metoprolol succinate XL (Toprol-XL) 100 mg 24 hr tablet Take 1 tablet (100 mg) by mouth once daily.      triamterene-hydrochlorothiazid (Maxzide-25) 37.5-25 mg tablet TAKE 1 TABLET BY MOUTH IN THE MORNING ONCE DAILY      valsartan (Diovan) 160 mg tablet        No current facility-administered medications on file prior to visit.     No images are attached to the encounter.            Assessment/Plan   Problem List Items Addressed This Visit             ICD-10-CM    Viral URI - Primary J06.9     RSV, COVID, and flu swab   Advised patient to push fluids and start use of cool mist humidifier  Patient to start using flonase nasal spray  Patient to call if develop new or worsening symptom         Relevant Medications    fluticasone (Flonase) 50 mcg/actuation nasal spray    Other Relevant Orders    RSV PCR    Influenza A, and B PCR    Sars-CoV-2 PCR, Symptomatic

## 2024-01-16 LAB
FLUAV RNA RESP QL NAA+PROBE: NOT DETECTED
FLUBV RNA RESP QL NAA+PROBE: NOT DETECTED
RSV RNA RESP QL NAA+PROBE: NOT DETECTED
SARS-COV-2 RNA RESP QL NAA+PROBE: NOT DETECTED

## 2024-02-13 DIAGNOSIS — I10 PRIMARY HYPERTENSION: ICD-10-CM

## 2024-02-13 RX ORDER — METOPROLOL SUCCINATE 100 MG/1
100 TABLET, EXTENDED RELEASE ORAL DAILY
Qty: 90 TABLET | Refills: 1 | Status: SHIPPED | OUTPATIENT
Start: 2024-02-13

## 2024-02-13 RX ORDER — TRIAMTERENE/HYDROCHLOROTHIAZID 37.5-25 MG
TABLET ORAL
Qty: 90 TABLET | Refills: 1 | Status: SHIPPED | OUTPATIENT
Start: 2024-02-13

## 2024-02-13 RX ORDER — VALSARTAN 160 MG/1
160 TABLET ORAL DAILY
Qty: 90 TABLET | Refills: 1 | Status: SHIPPED | OUTPATIENT
Start: 2024-02-13

## 2024-02-28 ENCOUNTER — TELEPHONE (OUTPATIENT)
Dept: PRIMARY CARE | Facility: CLINIC | Age: 44
End: 2024-02-28
Payer: COMMERCIAL

## 2024-02-28 NOTE — TELEPHONE ENCOUNTER
Pt called and said he was seen in Urgent Care in [place] on 2/22/24 and diagnosed with the flu he is still having shortness of breath severe cough , dizzy and chills and hot sweats and has finished Tamiflu. Pt was advised to go to ER

## 2024-05-07 ENCOUNTER — TELEMEDICINE (OUTPATIENT)
Dept: PHARMACY | Facility: HOSPITAL | Age: 44
End: 2024-05-07
Payer: COMMERCIAL

## 2024-05-07 ENCOUNTER — OFFICE VISIT (OUTPATIENT)
Dept: PRIMARY CARE | Facility: CLINIC | Age: 44
End: 2024-05-07
Payer: COMMERCIAL

## 2024-05-07 VITALS
HEART RATE: 79 BPM | DIASTOLIC BLOOD PRESSURE: 78 MMHG | WEIGHT: 262.6 LBS | SYSTOLIC BLOOD PRESSURE: 138 MMHG | BODY MASS INDEX: 34.65 KG/M2

## 2024-05-07 DIAGNOSIS — J30.9 ALLERGIC RHINITIS, UNSPECIFIED SEASONALITY, UNSPECIFIED TRIGGER: ICD-10-CM

## 2024-05-07 DIAGNOSIS — J45.909 ASTHMA, UNSPECIFIED ASTHMA SEVERITY, UNSPECIFIED WHETHER COMPLICATED, UNSPECIFIED WHETHER PERSISTENT (HHS-HCC): Primary | ICD-10-CM

## 2024-05-07 PROCEDURE — 3008F BODY MASS INDEX DOCD: CPT | Performed by: FAMILY MEDICINE

## 2024-05-07 PROCEDURE — RXMED WILLOW AMBULATORY MEDICATION CHARGE

## 2024-05-07 PROCEDURE — 96372 THER/PROPH/DIAG INJ SC/IM: CPT | Performed by: FAMILY MEDICINE

## 2024-05-07 PROCEDURE — 99214 OFFICE O/P EST MOD 30 MIN: CPT | Performed by: FAMILY MEDICINE

## 2024-05-07 PROCEDURE — 1036F TOBACCO NON-USER: CPT | Performed by: FAMILY MEDICINE

## 2024-05-07 RX ORDER — SEMAGLUTIDE 0.25 MG/.5ML
0.25 INJECTION, SOLUTION SUBCUTANEOUS
Qty: 2 ML | Refills: 0 | Status: SHIPPED | OUTPATIENT
Start: 2024-05-07 | End: 2024-06-03 | Stop reason: DRUGHIGH

## 2024-05-07 RX ORDER — TRIAMCINOLONE ACETONIDE 40 MG/ML
80 INJECTION, SUSPENSION INTRA-ARTICULAR; INTRAMUSCULAR ONCE
Status: COMPLETED | OUTPATIENT
Start: 2024-05-07 | End: 2024-05-07

## 2024-05-07 RX ADMIN — TRIAMCINOLONE ACETONIDE 80 MG: 40 INJECTION, SUSPENSION INTRA-ARTICULAR; INTRAMUSCULAR at 11:48

## 2024-05-07 ASSESSMENT — ENCOUNTER SYMPTOMS
FEVER: 0
DIARRHEA: 0
PALPITATIONS: 0
ARTHRALGIAS: 0
SINUS PRESSURE: 0
APPETITE CHANGE: 0
CONSTIPATION: 0
FACIAL SWELLING: 0
COUGH: 0
CONFUSION: 0
WHEEZING: 0
RHINORRHEA: 1
EYE DISCHARGE: 0
ACTIVITY CHANGE: 0
COLOR CHANGE: 0
CHILLS: 0

## 2024-05-07 ASSESSMENT — PATIENT HEALTH QUESTIONNAIRE - PHQ9
2. FEELING DOWN, DEPRESSED OR HOPELESS: NOT AT ALL
1. LITTLE INTEREST OR PLEASURE IN DOING THINGS: NOT AT ALL
SUM OF ALL RESPONSES TO PHQ9 QUESTIONS 1 AND 2: 0
10. IF YOU CHECKED OFF ANY PROBLEMS, HOW DIFFICULT HAVE THESE PROBLEMS MADE IT FOR YOU TO DO YOUR WORK, TAKE CARE OF THINGS AT HOME, OR GET ALONG WITH OTHER PEOPLE: NOT DIFFICULT AT ALL

## 2024-05-07 NOTE — PROGRESS NOTES
"Subjective   Patient ID: Humphrey Villarreal is a 43 y.o. male who presents for Obesity.    Referring Provider: Katherine Nieto DO     HPI  Patient reports difficulty maintaining or losing weight with diet, exercise and has made multiple attempts to lose weight before. He identifies that a medication to help accomplish lifestyle changes would be helpful to his weight loss.     Review of Systems    Medication System Management:  Affordability/Accessibility: no issues identified  Adherence/Organization: excellent  Adverse Effects: none, is aware of strategies to mitigate nausea and GERD    Objective     There were no vitals taken for this visit.     Labs  Lab Results   Component Value Date    BILITOT 0.6 08/03/2023    CALCIUM 9.2 08/03/2023    CO2 30 08/03/2023     08/03/2023    CREATININE 0.85 08/03/2023    GLUCOSE 110 (H) 08/03/2023    ALKPHOS 47 08/03/2023    K 3.7 08/03/2023    PROT 6.7 08/03/2023     08/03/2023    AST 26 08/03/2023    ALT 42 08/03/2023    BUN 16 08/03/2023    ANIONGAP 13 08/03/2023    ALBUMIN 4.5 08/03/2023    AMYLASE 26 (L) 08/03/2023    LIPASE 28 08/03/2023    GFRMALE >90 08/03/2023     Lab Results   Component Value Date    TRIG 287 (H) 08/03/2023    CHOL 145 08/03/2023    HDL 32.0 (A) 08/03/2023     No results found for: \"HGBA1C\"    Current Outpatient Medications on File Prior to Visit   Medication Sig Dispense Refill    esomeprazole (NexIUM) 40 mg DR capsule Take 1 capsule (40 mg) by mouth once daily in the morning. Take before meals. Do not open capsule.      fluticasone (Flonase) 50 mcg/actuation nasal spray Administer 2 sprays into each nostril once daily. Shake gently. Before first use, prime pump. After use, clean tip and replace cap. 16 g 1    metoprolol succinate XL (Toprol-XL) 100 mg 24 hr tablet Take 1 tablet (100 mg) by mouth once daily. 90 tablet 1    triamterene-hydrochlorothiazid (Maxzide-25) 37.5-25 mg tablet TAKE 1 TABLET BY MOUTH IN THE MORNING ONCE DAILY 90 tablet 1 "    valsartan (Diovan) 160 mg tablet Take 1 tablet (160 mg) by mouth once daily. 90 tablet 1     Current Facility-Administered Medications on File Prior to Visit   Medication Dose Route Frequency Provider Last Rate Last Admin    [COMPLETED] triamcinolone acetonide (Kenalog-40) injection 80 mg  80 mg intramuscular Once Katherine Nieto, DO   80 mg at 05/07/24 1148        Assessment/Plan   Problem List Items Addressed This Visit       Adult BMI 34.0-34.9 kg/sq m   Initiate Wegovy 0.25 mg weekly, titrate in 4 weeks. Counseled on diet and lifestyle maintenance to accomplish weight loss goals.  Follow-up in 4 weeks around noon    Brendon Perry PharmD    Continue all meds under the continuation of care with the referring provider and clinical pharmacy team.

## 2024-05-07 NOTE — PROGRESS NOTES
GABRIELLA/CM Discharge Plan  Informed patient is ready for discharge. Patient’s discharge destination is Home/apartment with Services/Support. Patient to be picked up by Family.  Patient/interested person has been counseled for post hospitalization care.   . Initial implementation of the patient’s discharge plan has been arranged, including any devices/equipment needed for discharge. Discharge plan communicated to MD, RN, MONTEZ, ROME, GABRIELLA and Receiving Facility/Agency.    After Visit Summary - Transition Report Information  Receiving Agency/Facility: [facility]  Receiving Agency/Facility phone number: [phone]  Receiving Agency/Facility Type: Home care     LACE Score: 16  Order for resumption of RN and PT services noted.  No further SW needs at this time.  Please re-consult SW if needs arise.   Subjective   Patient ID: Humphrey Villarreal is a 43 y.o. male who presents for No chief complaint on file..    HPI   Patient comes in stating that he has had difficulty with his allergies. He has grass, ragweed, dust, cats, dogs and most recently tree pollen.     Review of Systems   Constitutional:  Negative for activity change, appetite change, chills and fever.   HENT:  Positive for rhinorrhea and sneezing. Negative for congestion, ear pain, facial swelling and sinus pressure.    Eyes:  Negative for discharge.   Respiratory:  Negative for cough and wheezing.    Cardiovascular:  Negative for chest pain, palpitations and leg swelling.   Gastrointestinal:  Negative for constipation and diarrhea.   Musculoskeletal:  Negative for arthralgias.   Skin:  Negative for color change and pallor.   Neurological:  Negative for syncope.   Psychiatric/Behavioral:  Negative for confusion.        Objective   There were no vitals taken for this visit.  BSA There is no height or weight on file to calculate BSA.      Physical Exam  Constitutional:       General: He is not in acute distress.     Appearance: Normal appearance. He is not toxic-appearing.   HENT:      Head: Normocephalic.      Right Ear: Tympanic membrane, ear canal and external ear normal.      Left Ear: Tympanic membrane, ear canal and external ear normal.   Eyes:      Conjunctiva/sclera: Conjunctivae normal.      Pupils: Pupils are equal, round, and reactive to light.   Cardiovascular:      Rate and Rhythm: Normal rate and regular rhythm.      Pulses: Normal pulses.      Heart sounds: Normal heart sounds.   Pulmonary:      Effort: No respiratory distress.      Breath sounds: No wheezing, rhonchi or rales.   Musculoskeletal:         General: No swelling or tenderness.   Skin:     Findings: No lesion or rash.   Neurological:      General: No focal deficit present.      Mental Status: He is alert and oriented to person, place, and time. Mental status is at baseline.       Gait: Gait normal.   Psychiatric:         Mood and Affect: Mood normal.         Behavior: Behavior normal.         Thought Content: Thought content normal.         Judgment: Judgment normal.       Office Visit on 01/15/2024   Component Date Value Ref Range Status    RSV PCR 01/15/2024 Not Detected  Not Detected Final    Flu A Result 01/15/2024 Not Detected  Not Detected Final    Flu B Result 01/15/2024 Not Detected  Not Detected Final    Coronavirus 2019, PCR 01/15/2024 Not Detected  Not Detected Final   Lab on 08/03/2023   Component Date Value Ref Range Status    Amylase 08/03/2023 26 (L)  29 - 103 U/L Final    Lipase 08/03/2023 28  9 - 82 U/L Final     Venipuncture immediately after or during the    administration of Metamizole may lead to falsely   low results. Testing should be performed immediately   prior to Metamizole dosing.    Clam IgE 08/03/2023 <0.10  <0.35 KU/L Final      SEE IMMUNOCAP INTERP.IGE     Fish (Cod) IgE 08/03/2023 <0.10  <0.35 KU/L Final      SEE IMMUNOCAP INTERP.IGE     Randolph, Corn IgE 08/03/2023 <0.10  <0.35 KU/L Final      SEE IMMUNOCAP INTERP.IGE     Egg White IgE 08/03/2023 <0.10  <0.35 KU/L Final      SEE IMMUNOCAP INTERP.IGE     Milk IgE 08/03/2023 <0.10  <0.35 KU/L Final      SEE IMMUNOCAP INTERP.IGE     Peanut IgE 08/03/2023 <0.10  <0.35 KU/L Final      SEE IMMUNOCAP INTERP.IGE     Scallop IgE 08/03/2023 <0.10  <0.35 KU/L Final      SEE IMMUNOCAP INTERP.IGE     Sesame Seed IgE 08/03/2023 <0.10  <0.35 KU/L Final      SEE IMMUNOCAP INTERP.IGE     Shrimp IgE 08/03/2023 <0.10  <0.35 KU/L Final      SEE IMMUNOCAP INTERP.IGE     Soybean IgE 08/03/2023 <0.10  <0.35 KU/L Final      SEE IMMUNOCAP INTERP.IGE     [place] IgE 08/03/2023 <0.10  <0.35 KU/L Final      SEE IMMUNOCAP INTERP.IGE     Wheat IgE 08/03/2023 <0.10  <0.35 KU/L Final      SEE IMMUNOCAP INTERP.IGE     Immunocap Interpretation 08/03/2023 SEE COMMENT   Final         REFERENCE RANGE (IMMUNOCAP) IGE   KU/L           CLASS      INTERPRETATION       <  0.10       0       BELOW DETECTION   0.10-  0.34      0/1      EQUIVOCAL   0.35-  0.69       1       LOW POSITIVE   0.70-  3.49       2       MODERATE POSITIVE   3.50- 17.49       3       HIGH POSITIVE  17.50- 49          4       VERY HIGH POSITIVE  50   - 99          5       VERY HIGH POSITIVE       >100          6       VERY HIGH POSITIVE    Immunocap IgE 08/03/2023 19.9  0.0 - 214.0 KU/L Final     Note:  Omalizumab (Xolair, GeneDatanomic; humanized    IgG1 antihuman IgE Fc) treatment does not    significantly interfere with the accuracy of    total IgE on the ImmunoCAP ([employer]) platform.    J Allergy Clin Immunol 2006;117:759-66).   Allergens, parasitic diseases, smoking, and   alcohol consumption have been reported to   increase levels of total IgE in serum.    B[place] Grass IgE 08/03/2023 <0.10  <0.35 KU/L Final      SEE IMMUNOCAP INTERP.IGE     Phil Grass IgE 08/03/2023 <0.10  <0.35 KU/L Final      SEE IMMUNOCAP INTERP.IGE     [place] Grass, [place] Blue IgE 08/03/2023 <0.10  <0.35 KU/L Final      SEE IMMUNOCAP INTERP.IGE     Esteban Grass IgE 08/03/2023 <0.10  <0.35 KU/L Final      SEE IMMUNOCAP INTERP.IGE     [place], [place] IgE 08/03/2023 <0.10  <0.35 KU/L Final      SEE IMMUNOCAP INTERP.IGE     Common Pigweed IgE 08/03/2023 <0.10  <0.35 KU/L Final      SEE IMMUNOCAP INTERP.IGE     Common Ragweed IgE 08/03/2023 <0.10  <0.35 KU/L Final      SEE IMMUNOCAP INTERP.IGE     White [place] IgE 08/03/2023 <0.10  <0.35 KU/L Final      SEE IMMUNOCAP INTERP.IGE     Common Silver Birch IgE 08/03/2023 0.50 (A)  <0.35 KU/L Final      SEE IMMUNOCAP INTERP.IGE     Box-Elder IgE 08/03/2023 0.12  <0.35 KU/L Final      SEE IMMUNOCAP INTERP.IGE     Mountain Juniper IgE 08/03/2023 <0.10  <0.35 KU/L Final      SEE IMMUNOCAP INTERP.IGE     Cedar IgE 08/03/2023 <0.10  <0.35 KU/L Final      SEE IMMUNOCAP INTERP.IGE     El IgE 08/03/2023 <0.10  <0.35 KU/L Final      SEE IMMUNOCAP INTERP.IGE      Balsam IgE 08/03/2023 <0.10  <0.35 KU/L Final      SEE IMMUNOCAP INTERP.IGE     [place] IgE 08/03/2023 0.41 (A)  <0.35 KU/L Final      SEE IMMUNOCAP INTERP.IGE     William Rowe IgE 08/03/2023 0.27  <0.35 KU/L Final      SEE IMMUNOCAP INTERP.IGE     Maple Granite Quarry [place], [place] * 08/03/2023 <0.10  <0.35 KU/L Final      SEE IMMUNOCAP INTERP.IGE     [place] Tree IgE 08/03/2023 0.16  <0.35 KU/L Final      SEE IMMUNOCAP INTERP.IGE     Prickly Saltwort/Russian Thistle I* 08/03/2023 <0.10  <0.35 KU/L Final      SEE IMMUNOCAP INTERP.IGE     Sheep Sorrel IgE 08/03/2023 <0.10  <0.35 KU/L Final      SEE IMMUNOCAP INTERP.IGE     Cat Dander IgE 08/03/2023 <0.10  <0.35 KU/L Final      SEE IMMUNOCAP INTERP.IGE     Dog Dander IgE 08/03/2023 <0.10  <0.35 KU/L Final      SEE IMMUNOCAP INTERP.IGE     Alternaria Alternata IgE 08/03/2023 <0.10  <0.35 KU/L Final      SEE IMMUNOCAP INTERP.IGE     Aspergillus Fumigatus IgE 08/03/2023 <0.10  <0.35 KU/L Final      SEE IMMUNOCAP INTERP.IGE     Cladosporium Herbarum IgE 08/03/2023 <0.10  <0.35 KU/L Final      SEE IMMUNOCAP INTERP.IGE     Penicillium Chrysogenum (P. notatu* 08/03/2023 <0.10  <0.35 KU/L Final      SEE IMMUNOCAP INTERP.IGE     Plantain IgE 08/03/2023 <0.10  <0.35 KU/L Final      SEE IMMUNOCAP INTERP.IGE     Dust Mite (D. farinae) IgE 08/03/2023 0.14  <0.35 KU/L Final      SEE IMMUNOCAP INTERP.IGE     Dust Mite (D. pteronyssinus) IgE 08/03/2023 0.17  <0.35 KU/L Final      SEE IMMUNOCAP INTERP.IGE     Russian Cockroach IgE 08/03/2023 <0.10  <0.35 KU/L Final      SEE IMMUNOCAP INTERP.IGE     Immunocap Interpretation 08/03/2023 SEE COMMENT   Final         REFERENCE RANGE (IMMUNOCAP) IGE   KU/L           CLASS     INTERPRETATION       <  0.10       0       BELOW DETECTION   0.10-  0.34      0/1      EQUIVOCAL   0.35-  0.69       1       LOW POSITIVE   0.70-  3.49       2       MODERATE POSITIVE   3.50- 17.49       3       HIGH POSITIVE  17.50- 49          4       VERY HIGH  POSITIVE  50   - 99          5       VERY HIGH POSITIVE       >100          6       VERY HIGH POSITIVE    WBC 08/03/2023 5.1  4.4 - 11.3 x10E9/L Final    nRBC 08/03/2023 0.0  0.0 - 0.0 /100 WBC Final    RBC 08/03/2023 5.00  4.50 - 5.90 x10E12/L Final    Hemoglobin 08/03/2023 15.8  13.5 - 17.5 g/dL Final    Hematocrit 08/03/2023 47.4  41.0 - 52.0 % Final    MCV 08/03/2023 95  80 - 100 fL Final    MCHC 08/03/2023 33.3  32.0 - 36.0 g/dL Final    Platelets 08/03/2023 146 (L)  150 - 450 x10E9/L Final    RDW 08/03/2023 12.4  11.5 - 14.5 % Final    Neutrophils % 08/03/2023 66.2  40.0 - 80.0 % Final    Immature Granulocytes %, Automated 08/03/2023 0.4  0.0 - 0.9 % Final     Immature Granulocyte Count (IG) includes promyelocytes,    myelocytes and metamyelocytes but does not include bands.   Percent differential counts (%) should be interpreted in the   context of the absolute cell counts (cells/L).    Lymphocytes % 08/03/2023 21.2  13.0 - 44.0 % Final    Monocytes % 08/03/2023 9.7  2.0 - 10.0 % Final    Eosinophils % 08/03/2023 1.9  0.0 - 6.0 % Final    Basophils % 08/03/2023 0.6  0.0 - 2.0 % Final    Neutrophils Absolute 08/03/2023 3.40  1.20 - 7.70 x10E9/L Final    Lymphocytes Absolute 08/03/2023 1.09 (L)  1.20 - 4.80 x10E9/L Final    Monocytes Absolute 08/03/2023 0.50  0.10 - 1.00 x10E9/L Final    Eosinophils Absolute 08/03/2023 0.10  0.00 - 0.70 x10E9/L Final    Basophils Absolute 08/03/2023 0.03  0.00 - 0.10 x10E9/L Final    Glucose 08/03/2023 110 (H)  74 - 99 mg/dL Final    Sodium 08/03/2023 139  136 - 145 mmol/L Final    Potassium 08/03/2023 3.7  3.5 - 5.3 mmol/L Final    Chloride 08/03/2023 100  98 - 107 mmol/L Final    Bicarbonate 08/03/2023 30  21 - 32 mmol/L Final    Anion Gap 08/03/2023 13  10 - 20 mmol/L Final    Urea Nitrogen 08/03/2023 16  6 - 23 mg/dL Final    Creatinine 08/03/2023 0.85  0.50 - 1.30 mg/dL Final    GFR MALE 08/03/2023 >90  >90 mL/min/1.73m2 Final     CALCULATIONS OF ESTIMATED GFR ARE  PERFORMED   USING THE 2021 CKD-EPI STUDY REFIT EQUATION   WITHOUT THE RACE VARIABLE FOR THE IDMS-TRACEABLE   CREATININE METHODS.    https://jasn.asnjournals.org/content/early/2021/09/22/ASN.[email or web address]    Calcium 08/03/2023 9.2  8.6 - 10.6 mg/dL Final    Albumin 08/03/2023 4.5  3.4 - 5.0 g/dL Final    Alkaline Phosphatase 08/03/2023 47  33 - 120 U/L Final    Total Protein 08/03/2023 6.7  6.4 - 8.2 g/dL Final    AST 08/03/2023 26  9 - 39 U/L Final    Total Bilirubin 08/03/2023 0.6  0.0 - 1.2 mg/dL Final    ALT (SGPT) 08/03/2023 42  10 - 52 U/L Final     Patients treated with Sulfasalazine may generate    falsely decreased results for ALT.    Cholesterol 08/03/2023 145  0 - 199 mg/dL Final    .      AGE      DESIRABLE   BORDERLINE HIGH   HIGH     0-19 Y     0 - 169       170 - 199     >/= 200    20-24 Y     0 - 189       190 - 224     >/= 225         >24 Y     0 - 199       200 - 239     >/= 240   **All ranges are based on fasting samples. Specific   therapeutic targets will vary based on patient-specific   cardiac risk.  .   Pediatric guidelines reference:Pediatrics 2011, 128(S5).   Adult guidelines reference: NCEP ATPIII Guidelines,     JOHANNY 2001, 258:2486-97  .   Venipuncture immediately after or during the    administration of Metamizole may lead to falsely   low results. Testing should be performed immediately   prior to Metamizole dosing.    HDL 08/03/2023 32.0 (A)  mg/dL Final    .      AGE      VERY LOW   LOW     NORMAL    HIGH       0-19 Y       < 35   < 40     40-45     ----    20-24 Y       ----   < 40       >45     ----      >24 Y       ----   < 40     40-60      >60  .    Cholesterol/HDL Ratio 08/03/2023 4.5   Final    REF VALUES  DESIRABLE  < 3.4  HIGH RISK  > 5.0    LDL 08/03/2023 56  0 - 99 mg/dL Final    .                           NEAR      BORD      AGE      DESIRABLE  OPTIMAL    HIGH     HIGH     VERY HIGH     0-19 Y     0 - 109     ---    110-129   >/= 130     ----    20-24 Y     0 - 119     ---     120-159   >/= 160     ----      >24 Y     0 -  99   100-129  130-159   160-189     >/=190  .    VLDL 08/03/2023 57 (H)  0 - 40 mg/dL Final    Triglycerides 08/03/2023 287 (H)  0 - 149 mg/dL Final    .      AGE      DESIRABLE   BORDERLINE HIGH   HIGH     VERY HIGH   0 D-90 D    19 - 174         ----         ----        ----  91 D- 9 Y     0 -  74        75 -  99     >/= 100      ----    10-19 Y     0 -  89        90 - 129     >/= 130      ----    20-24 Y     0 - 114       115 - 149     >/= 150      ----         >24 Y     0 - 149       150 - 199    200- 499    >/= 500  .   Venipuncture immediately after or during the    administration of Metamizole may lead to falsely   low results. Testing should be performed immediately   prior to Metamizole dosing.    Non HDL Cholesterol 08/03/2023 113  mg/dL Final        AGE      DESIRABLE   BORDERLINE HIGH   HIGH     VERY HIGH     0-19 Y     0 - 119       120 - 144     >/= 145    >/= 160    20-24 Y     0 - 149       150 - 189     >/= 190      ----         >24 Y    30 MG/DL ABOVE LDL CHOLESTEROL GOAL  .    TSH 08/03/2023 0.92  0.44 - 3.98 mIU/L Final     TSH testing is performed using different testing    methodology at [facility] than at other    [facility]. Direct result comparisons should    only be made within the same method.     Current Outpatient Medications on File Prior to Visit   Medication Sig Dispense Refill    esomeprazole (NexIUM) 40 mg DR capsule Take 1 capsule (40 mg) by mouth once daily in the morning. Take before meals. Do not open capsule.      fluticasone (Flonase) 50 mcg/actuation nasal spray Administer 2 sprays into each nostril once daily. Shake gently. Before first use, prime pump. After use, clean tip and replace cap. 16 g 1    metoprolol succinate XL (Toprol-XL) 100 mg 24 hr tablet Take 1 tablet (100 mg) by mouth once daily. 90 tablet 1    triamterene-hydrochlorothiazid (Maxzide-25) 37.5-25 mg tablet TAKE 1 TABLET BY MOUTH IN THE  MORNING ONCE DAILY 90 tablet 1    valsartan (Diovan) 160 mg tablet Take 1 tablet (160 mg) by mouth once daily. 90 tablet 1     No current facility-administered medications on file prior to visit.     No images are attached to the encounter.            Assessment/Plan   There are no diagnoses linked to this encounter.  Patient to continue on current medication  Patient to call if questions or concerns

## 2024-05-09 ENCOUNTER — PHARMACY VISIT (OUTPATIENT)
Dept: PHARMACY | Facility: CLINIC | Age: 44
End: 2024-05-09
Payer: COMMERCIAL

## 2024-06-03 ENCOUNTER — APPOINTMENT (OUTPATIENT)
Dept: PHARMACY | Facility: HOSPITAL | Age: 44
End: 2024-06-03
Payer: COMMERCIAL

## 2024-06-03 ENCOUNTER — TELEMEDICINE (OUTPATIENT)
Dept: PHARMACY | Facility: HOSPITAL | Age: 44
End: 2024-06-03
Payer: COMMERCIAL

## 2024-06-03 PROCEDURE — RXMED WILLOW AMBULATORY MEDICATION CHARGE

## 2024-06-03 RX ORDER — SEMAGLUTIDE 0.5 MG/.5ML
0.5 INJECTION, SOLUTION SUBCUTANEOUS
Qty: 2 ML | Refills: 2 | Status: SHIPPED | OUTPATIENT
Start: 2024-06-03

## 2024-06-03 NOTE — PROGRESS NOTES
"Subjective   Patient ID: Humphrey Villarreal is a 43 y.o. male who presents for No chief complaint on file..    Referring Provider: DO TICO Saha  Patient reports difficulty maintaining or losing weight with diet, exercise and has made multiple attempts to lose weight before. He identifies that a medication to help accomplish lifestyle changes would be helpful to his weight loss.     Review of Systems    Medication System Management:  Affordability/Accessibility: no issues identified  Adherence/Organization: excellent  Adverse Effects: none, is aware of strategies to mitigate nausea and GERD    Objective     There were no vitals taken for this visit.     Labs  Lab Results   Component Value Date    BILITOT 0.6 08/03/2023    CALCIUM 9.2 08/03/2023    CO2 30 08/03/2023     08/03/2023    CREATININE 0.85 08/03/2023    GLUCOSE 110 (H) 08/03/2023    ALKPHOS 47 08/03/2023    K 3.7 08/03/2023    PROT 6.7 08/03/2023     08/03/2023    AST 26 08/03/2023    ALT 42 08/03/2023    BUN 16 08/03/2023    ANIONGAP 13 08/03/2023    ALBUMIN 4.5 08/03/2023    AMYLASE 26 (L) 08/03/2023    LIPASE 28 08/03/2023    GFRMALE >90 08/03/2023     Lab Results   Component Value Date    TRIG 287 (H) 08/03/2023    CHOL 145 08/03/2023    HDL 32.0 (A) 08/03/2023     No results found for: \"HGBA1C\"    Current Outpatient Medications on File Prior to Visit   Medication Sig Dispense Refill    esomeprazole (NexIUM) 40 mg DR capsule Take 1 capsule (40 mg) by mouth once daily in the morning. Take before meals. Do not open capsule.      fluticasone (Flonase) 50 mcg/actuation nasal spray Administer 2 sprays into each nostril once daily. Shake gently. Before first use, prime pump. After use, clean tip and replace cap. 16 g 1    metoprolol succinate XL (Toprol-XL) 100 mg 24 hr tablet Take 1 tablet (100 mg) by mouth once daily. 90 tablet 1    semaglutide, weight loss, (Wegovy) 0.25 mg/0.5 mL pen injector Inject 0.25 mg under the skin 1 (one) " time per week. 2 mL 0    triamterene-hydrochlorothiazid (Maxzide-25) 37.5-25 mg tablet TAKE 1 TABLET BY MOUTH IN THE MORNING ONCE DAILY 90 tablet 1    valsartan (Diovan) 160 mg tablet Take 1 tablet (160 mg) by mouth once daily. 90 tablet 1     No current facility-administered medications on file prior to visit.        Assessment/Plan   Problem List Items Addressed This Visit       Adult BMI 34.0-34.9 kg/sq m - Primary   Initiate Wegovy 0.5 mg weekly, assess potential to titrate in 4 weeks. Counseled on diet and lifestyle maintenance to accomplish weight loss goals.  Follow-up 7/1/24 @ 7335    Brendon Perry, PharmD    Continue all meds under the continuation of care with the referring provider and clinical pharmacy team.

## 2024-06-05 ENCOUNTER — PHARMACY VISIT (OUTPATIENT)
Dept: PHARMACY | Facility: CLINIC | Age: 44
End: 2024-06-05
Payer: COMMERCIAL

## 2024-06-11 ENCOUNTER — APPOINTMENT (OUTPATIENT)
Dept: PHARMACY | Facility: HOSPITAL | Age: 44
End: 2024-06-11
Payer: COMMERCIAL

## 2024-07-01 ENCOUNTER — APPOINTMENT (OUTPATIENT)
Dept: PHARMACY | Facility: HOSPITAL | Age: 44
End: 2024-07-01
Payer: COMMERCIAL

## 2024-07-01 PROCEDURE — RXMED WILLOW AMBULATORY MEDICATION CHARGE

## 2024-07-01 NOTE — PROGRESS NOTES
"Subjective   Patient ID: Humphrey Villarreal is a 44 y.o. male who presents for Weight Loss.    Referring Provider: DO TICO Saha  Patient reports difficulty maintaining or losing weight with diet, exercise and has made multiple attempts to lose weight before. He identifies that a medication to help accomplish lifestyle changes would be helpful to his weight loss.     Review of Systems    Medication System Management:  Affordability/Accessibility: no issues identified  Adherence/Organization: excellent  Adverse Effects: none, is aware of strategies to mitigate nausea and GERD, no constant issues.     Current weight is 250 lbs    Objective     There were no vitals taken for this visit.     Labs  Lab Results   Component Value Date    BILITOT 0.6 08/03/2023    CALCIUM 9.2 08/03/2023    CO2 30 08/03/2023     08/03/2023    CREATININE 0.85 08/03/2023    GLUCOSE 110 (H) 08/03/2023    ALKPHOS 47 08/03/2023    K 3.7 08/03/2023    PROT 6.7 08/03/2023     08/03/2023    AST 26 08/03/2023    ALT 42 08/03/2023    BUN 16 08/03/2023    ANIONGAP 13 08/03/2023    ALBUMIN 4.5 08/03/2023    AMYLASE 26 (L) 08/03/2023    LIPASE 28 08/03/2023    GFRMALE >90 08/03/2023     Lab Results   Component Value Date    TRIG 287 (H) 08/03/2023    CHOL 145 08/03/2023    HDL 32.0 (A) 08/03/2023     No results found for: \"HGBA1C\"    Current Outpatient Medications on File Prior to Visit   Medication Sig Dispense Refill    esomeprazole (NexIUM) 40 mg DR capsule Take 1 capsule (40 mg) by mouth once daily in the morning. Take before meals. Do not open capsule.      fluticasone (Flonase) 50 mcg/actuation nasal spray Administer 2 sprays into each nostril once daily. Shake gently. Before first use, prime pump. After use, clean tip and replace cap. 16 g 1    metoprolol succinate XL (Toprol-XL) 100 mg 24 hr tablet Take 1 tablet (100 mg) by mouth once daily. 90 tablet 1    semaglutide, weight loss, (Wegovy) 0.5 mg/0.5 mL pen injector Inject " 0.5 mg under the skin every 7 days. 2 mL 2    triamterene-hydrochlorothiazid (Maxzide-25) 37.5-25 mg tablet TAKE 1 TABLET BY MOUTH IN THE MORNING ONCE DAILY 90 tablet 1    valsartan (Diovan) 160 mg tablet Take 1 tablet (160 mg) by mouth once daily. 90 tablet 1     No current facility-administered medications on file prior to visit.        Assessment/Plan   Problem List Items Addressed This Visit       Adult BMI 34.0-34.9 kg/sq m    Relevant Orders    Follow Up In [facility] - Pharmacy   Initiate Wegovy 0.5 mg weekly, assess potential to titrate in 4 weeks. Counseled on diet and lifestyle maintenance to accomplish weight loss goals. Patient has 2 months of refills.   Follow-up 4 weeks @ 1130    Brendon Perry, PharmD    Continue all meds under the continuation of care with the referring provider and clinical pharmacy team.

## 2024-07-02 ENCOUNTER — PHARMACY VISIT (OUTPATIENT)
Dept: PHARMACY | Facility: CLINIC | Age: 44
End: 2024-07-02
Payer: COMMERCIAL

## 2024-07-08 ENCOUNTER — OFFICE VISIT (OUTPATIENT)
Dept: PRIMARY CARE | Facility: CLINIC | Age: 44
End: 2024-07-08
Payer: COMMERCIAL

## 2024-07-08 VITALS
DIASTOLIC BLOOD PRESSURE: 81 MMHG | WEIGHT: 252 LBS | SYSTOLIC BLOOD PRESSURE: 124 MMHG | BODY MASS INDEX: 33.25 KG/M2 | OXYGEN SATURATION: 96 % | HEART RATE: 95 BPM | TEMPERATURE: 98 F

## 2024-07-08 DIAGNOSIS — L02.214 CUTANEOUS ABSCESS OF GROIN: Primary | ICD-10-CM

## 2024-07-08 PROCEDURE — 99213 OFFICE O/P EST LOW 20 MIN: CPT | Performed by: STUDENT IN AN ORGANIZED HEALTH CARE EDUCATION/TRAINING PROGRAM

## 2024-07-08 PROCEDURE — 3008F BODY MASS INDEX DOCD: CPT | Performed by: STUDENT IN AN ORGANIZED HEALTH CARE EDUCATION/TRAINING PROGRAM

## 2024-07-08 PROCEDURE — 1036F TOBACCO NON-USER: CPT | Performed by: STUDENT IN AN ORGANIZED HEALTH CARE EDUCATION/TRAINING PROGRAM

## 2024-07-08 RX ORDER — DOXYCYCLINE 100 MG/1
100 CAPSULE ORAL 2 TIMES DAILY
Qty: 20 CAPSULE | Refills: 0 | Status: SHIPPED | OUTPATIENT
Start: 2024-07-08 | End: 2024-07-18

## 2024-07-08 ASSESSMENT — ENCOUNTER SYMPTOMS
FATIGUE: 0
ARTHRALGIAS: 0
ABDOMINAL PAIN: 0
CONSTIPATION: 0
NAUSEA: 0
FEVER: 0
DIZZINESS: 0
MYALGIAS: 0
CHILLS: 0
LIGHT-HEADEDNESS: 0
VOMITING: 0
COUGH: 0
RHINORRHEA: 0
NUMBNESS: 0
DIARRHEA: 0
SHORTNESS OF BREATH: 0

## 2024-07-08 NOTE — PROGRESS NOTES
Subjective   Patient ID: Humphrey Villarreal is a 44 y.o. male who presents for Mass (Inside left thigh).    HPI     Last night while taking a shower he noticed some extra skin or a bump on his inner left thigh.  The area is red and tender to palpation.  It states that he feels a bump over that area.  He does not think that he nicked the area while shaving and is not exactly sure what happened to there.  He has not had this before in the past.  No fevers or chills or any other symptoms.      Review of Systems   Constitutional:  Negative for chills, fatigue and fever.   HENT:  Negative for congestion and rhinorrhea.    Respiratory:  Negative for cough and shortness of breath.    Cardiovascular:  Negative for chest pain.   Gastrointestinal:  Negative for abdominal pain, constipation, diarrhea, nausea and vomiting.   Musculoskeletal:  Negative for arthralgias and myalgias.   Skin:         Skin redness and bump in left groin.   Neurological:  Negative for dizziness, light-headedness and numbness.       Objective   /81   Pulse 95   Temp 36.7 °C (98 °F)   Wt 114 kg (252 lb)   SpO2 96%   BMI 33.25 kg/m²     Physical Exam  Vitals and nursing note reviewed.   Constitutional:       General: He is not in acute distress.     Appearance: Normal appearance. He is normal weight. He is not ill-appearing or toxic-appearing.   HENT:      Head: Normocephalic and atraumatic.   Cardiovascular:      Rate and Rhythm: Normal rate and regular rhythm.      Heart sounds: Normal heart sounds.   Pulmonary:      Effort: Pulmonary effort is normal.      Breath sounds: Normal breath sounds.   Skin:     General: Skin is warm and dry.      Coloration: Skin is not jaundiced or pale.      Findings: Erythema (Erythema and tenderness around area and left groin.  Skin induration but no significant fluctuance.) present. No bruising, lesion or rash.   Neurological:      Mental Status: He is alert.         Assessment/Plan   Problem List Items  Addressed This Visit    None  Visit Diagnoses         Codes    Cutaneous abscess of groin    -  Primary L02.214    Relevant Medications    doxycycline (Vibramycin) 100 mg capsule          History and physical examination as above.  Patient presenting for evaluation.  Likely developing abscess in the left groin.  Discussed treatment options including antibiotic use in combination with incision and drainage.  Defer specific incision at least at this time.  Recommended antibiotic use and patient started on doxycycline.  Patient can start today and will plan on twice a day for the next 7 days.  Did also discussed use of warm compresses over the area to help to draw the infection closer to the surface.  Did discuss that if the area opens on its own he should use antibiotic ointment in addition to the oral antibiotics over the area to prevent further infection.  Did discuss that if he has any worsening of symptoms and worsening over the area or if he has any fevers or chills or any other full body symptoms despite being on current treatment, he would need to go to the emergency department.  Patient was tentatively scheduled in the next 2 days in case the area gets worse and it does need to be opened in the office.  Patient will call if the area either improves or if he does not need the appointment.

## 2024-07-08 NOTE — PATIENT INSTRUCTIONS
I am to go ahead and start an antibiotic.  You can pick this up today and start it immediately.  The medication will be twice a day giving you for the next 7 days.  Would recommend using warm compresses over the area which can definitely help to bring everything close to the surface so that it could open up as well on its own.  If this does happen, you can want to make sure that the area is drained.  You can use over-the-counter antibiotic ointment to help to prevent infection on top of the oral antibiotics that I am sending in.    I will go ahead and have you penciled in for an appointment on Thursday with me especially if this worsens so that we could open it up in the office.  If you have any other symptoms before that especially you have any fevers or chills or significant worsening of symptoms despite being on the antibiotics, I would recommend going immediately to the emergency department.  Please call with any additional questions or concerns.    Thank you

## 2024-07-09 ENCOUNTER — TELEPHONE (OUTPATIENT)
Dept: PRIMARY CARE | Facility: CLINIC | Age: 44
End: 2024-07-09
Payer: COMMERCIAL

## 2024-07-09 NOTE — TELEPHONE ENCOUNTER
Pt was in the office yesterday for an abscess. He is scheduled for 7/11 to have it drained, if needed. He said that it just opened on it's own & it has been draining; he has squeezed it some, but he is asking if he should try & get all of that out of there or just let it drain on it's own? He put neosporin on it & covered it lightly w/ a gauze pad.

## 2024-07-11 ENCOUNTER — APPOINTMENT (OUTPATIENT)
Dept: PRIMARY CARE | Facility: CLINIC | Age: 44
End: 2024-07-11
Payer: COMMERCIAL

## 2024-07-29 ENCOUNTER — APPOINTMENT (OUTPATIENT)
Dept: PHARMACY | Facility: HOSPITAL | Age: 44
End: 2024-07-29
Payer: COMMERCIAL

## 2024-07-29 PROCEDURE — RXMED WILLOW AMBULATORY MEDICATION CHARGE

## 2024-07-29 RX ORDER — SEMAGLUTIDE 1 MG/.5ML
1 INJECTION, SOLUTION SUBCUTANEOUS
Qty: 2 ML | Refills: 5 | Status: SHIPPED | OUTPATIENT
Start: 2024-08-04

## 2024-07-29 NOTE — PROGRESS NOTES
"Subjective   Patient ID: Humphrey Villarreal is a 44 y.o. male who presents for Weight Loss.    Referring Provider: DO TICO Saha  Patient reports difficulty maintaining or losing weight with diet, exercise and has made multiple attempts to lose weight before. He identifies that a medication to help accomplish lifestyle changes would be helpful to his weight loss.     Review of Systems    Medication System Management:  Affordability/Accessibility: no issues identified  Adherence/Organization: excellent  Adverse Effects: none, is aware of strategies to mitigate nausea and GERD, no constant issues.     Current weight is 248-250 lbs; would tolerate a dose increase    Objective     There were no vitals taken for this visit.     Labs  Lab Results   Component Value Date    BILITOT 0.6 08/03/2023    CALCIUM 9.2 08/03/2023    CO2 30 08/03/2023     08/03/2023    CREATININE 0.85 08/03/2023    GLUCOSE 110 (H) 08/03/2023    ALKPHOS 47 08/03/2023    K 3.7 08/03/2023    PROT 6.7 08/03/2023     08/03/2023    AST 26 08/03/2023    ALT 42 08/03/2023    BUN 16 08/03/2023    ANIONGAP 13 08/03/2023    ALBUMIN 4.5 08/03/2023    AMYLASE 26 (L) 08/03/2023    LIPASE 28 08/03/2023    GFRMALE >90 08/03/2023     Lab Results   Component Value Date    TRIG 287 (H) 08/03/2023    CHOL 145 08/03/2023    HDL 32.0 (A) 08/03/2023     No results found for: \"HGBA1C\"    Current Outpatient Medications on File Prior to Visit   Medication Sig Dispense Refill    esomeprazole (NexIUM) 40 mg DR capsule Take 1 capsule (40 mg) by mouth once daily in the morning. Take before meals. Do not open capsule.      fluticasone (Flonase) 50 mcg/actuation nasal spray Administer 2 sprays into each nostril once daily. Shake gently. Before first use, prime pump. After use, clean tip and replace cap. 16 g 1    metoprolol succinate XL (Toprol-XL) 100 mg 24 hr tablet Take 1 tablet (100 mg) by mouth once daily. 90 tablet 1    triamterene-hydrochlorothiazid " (Maxzide-25) 37.5-25 mg tablet TAKE 1 TABLET BY MOUTH IN THE MORNING ONCE DAILY 90 tablet 1    valsartan (Diovan) 160 mg tablet Take 1 tablet (160 mg) by mouth once daily. 90 tablet 1    [DISCONTINUED] semaglutide, weight loss, (Wegovy) 0.5 mg/0.5 mL pen injector Inject 0.5 mg under the skin every 7 days. 2 mL 2     No current facility-administered medications on file prior to visit.        Assessment/Plan   Problem List Items Addressed This Visit       Adult BMI 34.0-34.9 kg/sq m    Relevant Medications    semaglutide, weight loss, (Wegovy) 1 mg/0.5 mL pen injector (Start on 8/4/2024)    Other Relevant Orders    Follow Up In [facility]   Initiate Wegovy 1 mg weekly. Monitor weight weekly. Counseled on diet and lifestyle maintenance to accomplish weight loss goals. Patient can call if he notes lack of benefit.   Follow-up 4 weeks 10/28/24 @ 1100    Mya SchaeferD    Continue all meds under the continuation of care with the referring provider and clinical pharmacy team.

## 2024-08-01 ENCOUNTER — PHARMACY VISIT (OUTPATIENT)
Dept: PHARMACY | Facility: CLINIC | Age: 44
End: 2024-08-01
Payer: COMMERCIAL

## 2024-08-01 DIAGNOSIS — I10 PRIMARY HYPERTENSION: ICD-10-CM

## 2024-08-02 RX ORDER — TRIAMTERENE/HYDROCHLOROTHIAZID 37.5-25 MG
TABLET ORAL
Qty: 90 TABLET | Refills: 0 | Status: SHIPPED | OUTPATIENT
Start: 2024-08-02

## 2024-08-10 DIAGNOSIS — I10 PRIMARY HYPERTENSION: ICD-10-CM

## 2024-08-12 RX ORDER — METOPROLOL SUCCINATE 100 MG/1
100 TABLET, EXTENDED RELEASE ORAL DAILY
Qty: 90 TABLET | Refills: 0 | Status: SHIPPED | OUTPATIENT
Start: 2024-08-12

## 2024-08-16 DIAGNOSIS — I10 PRIMARY HYPERTENSION: ICD-10-CM

## 2024-08-16 RX ORDER — VALSARTAN 160 MG/1
160 TABLET ORAL DAILY
Qty: 90 TABLET | Refills: 1 | Status: SHIPPED | OUTPATIENT
Start: 2024-08-16

## 2024-08-30 PROCEDURE — RXMED WILLOW AMBULATORY MEDICATION CHARGE

## 2024-09-04 ENCOUNTER — PHARMACY VISIT (OUTPATIENT)
Dept: PHARMACY | Facility: CLINIC | Age: 44
End: 2024-09-04

## 2024-09-04 ENCOUNTER — PHARMACY VISIT (OUTPATIENT)
Dept: PHARMACY | Facility: CLINIC | Age: 44
End: 2024-09-04
Payer: COMMERCIAL

## 2024-10-16 RX ORDER — SEMAGLUTIDE 1 MG/.5ML
1 INJECTION, SOLUTION SUBCUTANEOUS
Qty: 2 ML | Refills: 5 | Status: SHIPPED | OUTPATIENT
Start: 2024-10-20 | End: 2024-10-18

## 2024-10-18 RX ORDER — SEMAGLUTIDE 1 MG/.5ML
1 INJECTION, SOLUTION SUBCUTANEOUS
Qty: 0.5 ML | Refills: 5 | Status: SHIPPED | OUTPATIENT
Start: 2024-10-20

## 2024-10-28 ENCOUNTER — APPOINTMENT (OUTPATIENT)
Dept: PHARMACY | Facility: HOSPITAL | Age: 44
End: 2024-10-28
Payer: COMMERCIAL

## 2024-11-08 ENCOUNTER — PATIENT MESSAGE (OUTPATIENT)
Dept: PRIMARY CARE | Facility: CLINIC | Age: 44
End: 2024-11-08

## 2024-11-08 DIAGNOSIS — I10 PRIMARY HYPERTENSION: ICD-10-CM

## 2024-11-08 RX ORDER — TRIAMTERENE/HYDROCHLOROTHIAZID 37.5-25 MG
TABLET ORAL
Qty: 90 TABLET | Refills: 1 | Status: SHIPPED | OUTPATIENT
Start: 2024-11-08

## 2024-11-08 RX ORDER — VALSARTAN 160 MG/1
160 TABLET ORAL DAILY
Qty: 90 TABLET | Refills: 1 | Status: SHIPPED | OUTPATIENT
Start: 2024-11-08

## 2024-11-08 RX ORDER — METOPROLOL SUCCINATE 100 MG/1
100 TABLET, EXTENDED RELEASE ORAL DAILY
Qty: 90 TABLET | Refills: 1 | Status: SHIPPED | OUTPATIENT
Start: 2024-11-08

## 2024-11-25 ENCOUNTER — APPOINTMENT (OUTPATIENT)
Dept: PHARMACY | Facility: HOSPITAL | Age: 44
End: 2024-11-25

## 2024-11-25 ENCOUNTER — OFFICE VISIT (OUTPATIENT)
Dept: URGENT CARE | Age: 44
End: 2024-11-25
Payer: COMMERCIAL

## 2024-11-25 VITALS
SYSTOLIC BLOOD PRESSURE: 113 MMHG | WEIGHT: 237 LBS | BODY MASS INDEX: 31.27 KG/M2 | OXYGEN SATURATION: 98 % | TEMPERATURE: 97.2 F | DIASTOLIC BLOOD PRESSURE: 79 MMHG | HEART RATE: 100 BPM | RESPIRATION RATE: 16 BRPM

## 2024-11-25 DIAGNOSIS — R05.1 ACUTE COUGH: Primary | ICD-10-CM

## 2024-11-25 PROCEDURE — 1036F TOBACCO NON-USER: CPT | Performed by: FAMILY MEDICINE

## 2024-11-25 PROCEDURE — 99213 OFFICE O/P EST LOW 20 MIN: CPT | Performed by: FAMILY MEDICINE

## 2024-11-25 RX ORDER — ALBUTEROL SULFATE 90 UG/1
2 INHALANT RESPIRATORY (INHALATION) EVERY 6 HOURS PRN
Qty: 1 G | Refills: 0 | Status: SHIPPED | OUTPATIENT
Start: 2024-11-25

## 2024-11-25 RX ORDER — BENZONATATE 200 MG/1
200 CAPSULE ORAL 3 TIMES DAILY PRN
Qty: 21 CAPSULE | Refills: 0 | Status: SHIPPED | OUTPATIENT
Start: 2024-11-25 | End: 2024-12-02

## 2024-11-25 ASSESSMENT — ENCOUNTER SYMPTOMS
EYE PAIN: 0
CHEST TIGHTNESS: 0
EYE DISCHARGE: 0
CHILLS: 0
SINUS PRESSURE: 0
SINUS PAIN: 0
SHORTNESS OF BREATH: 1
EYE REDNESS: 0
COUGH: 1
FEVER: 0
WHEEZING: 0
SORE THROAT: 1

## 2024-11-25 NOTE — PROGRESS NOTES
Subjective   Patient ID: Humphrey Villarreal is a 44 y.o. male. They present today with a chief complaint of Cough (Sore throat x 7 days, negative covid x2).    History of Present Illness    History provided by:  Patient  Cough  This is a new problem. The current episode started in the past 7 days (11/20/24). The problem has been gradually worsening. The problem occurs every few minutes. The cough is Non-productive. Associated symptoms include a sore throat and shortness of breath. Pertinent negatives include no chest pain, chills, ear pain, eye redness, fever or wheezing. Treatments tried: DayQuil/NyQuil. The treatment provided no relief.       Past Medical History  Allergies as of 11/25/2024 - Reviewed 11/25/2024   Allergen Reaction Noted    Diazepam Other 03/08/2017    Sulfa (sulfonamide antibiotics) Hives, Itching, Rash, and Swelling 08/08/1984       (Not in a hospital admission)       Past Medical History:   Diagnosis Date    Allergic     Asthma     GERD (gastroesophageal reflux disease)     Hypertension     Personal history of other diseases of the digestive system 02/03/2015    History of gastroesophageal reflux (GERD)       Past Surgical History:   Procedure Laterality Date    LUMBAR FUSION      L5 S1   3/2017        reports that he has quit smoking. His smoking use included cigarettes. He has never been exposed to tobacco smoke. He has never used smokeless tobacco. He reports current alcohol use. He reports that he does not use drugs.    Review of Systems  Review of Systems   Constitutional:  Negative for chills and fever.   HENT:  Positive for sore throat. Negative for ear pain, sinus pressure and sinus pain.    Eyes:  Negative for pain, discharge and redness.   Respiratory:  Positive for cough and shortness of breath. Negative for chest tightness and wheezing.    Cardiovascular:  Negative for chest pain.                                  Objective    Vitals:    11/25/24 1630   BP: 113/79   Pulse: 100   Resp:  16   Temp: 36.2 °C (97.2 °F)   SpO2: 98%   Weight: 108 kg (237 lb)     No LMP for male patient.    Physical Exam  Vitals reviewed.   Constitutional:       General: He is not in acute distress.     Appearance: He is normal weight.   HENT:      Head: Atraumatic.      Right Ear: Tympanic membrane and ear canal normal.      Left Ear: Tympanic membrane and ear canal normal.      Nose: Nose normal.      Right Sinus: No maxillary sinus tenderness or frontal sinus tenderness.      Left Sinus: No maxillary sinus tenderness or frontal sinus tenderness.      Mouth/Throat:      Mouth: Mucous membranes are moist.      Pharynx: No posterior oropharyngeal erythema.   Eyes:      Extraocular Movements: Extraocular movements intact.      Pupils: Pupils are equal, round, and reactive to light.   Cardiovascular:      Rate and Rhythm: Normal rate and regular rhythm.      Heart sounds: No murmur heard.     No friction rub.   Pulmonary:      Effort: Pulmonary effort is normal. No respiratory distress.      Breath sounds: No wheezing, rhonchi or rales.   Musculoskeletal:      Cervical back: No rigidity or tenderness.   Lymphadenopathy:      Cervical: No cervical adenopathy.   Neurological:      Mental Status: He is alert.         Procedures    Point of Care Test & Imaging Results from this visit  No results found for this visit on 11/25/24.   No results found.    Diagnostic study results (if any) were reviewed by Brendon Saucedo DO.    Assessment/Plan   Allergies, medications, history, and pertinent labs/EKGs/Imaging reviewed by Brendon Saucedo DO.     Orders and Diagnoses  There are no diagnoses linked to this encounter.    Medical Admin Record      Patient disposition: Home    Electronically signed by Brendon Saucedo DO  4:35 PM

## 2024-11-27 ENCOUNTER — TELEMEDICINE (OUTPATIENT)
Dept: PRIMARY CARE | Facility: CLINIC | Age: 44
End: 2024-11-27
Payer: COMMERCIAL

## 2024-11-27 DIAGNOSIS — J20.9 ACUTE BRONCHITIS, UNSPECIFIED ORGANISM: Primary | ICD-10-CM

## 2024-11-27 PROCEDURE — 1036F TOBACCO NON-USER: CPT | Performed by: FAMILY MEDICINE

## 2024-11-27 PROCEDURE — 99214 OFFICE O/P EST MOD 30 MIN: CPT | Performed by: FAMILY MEDICINE

## 2024-11-27 RX ORDER — AZITHROMYCIN 250 MG/1
TABLET, FILM COATED ORAL
Qty: 6 TABLET | Refills: 0 | Status: SHIPPED | OUTPATIENT
Start: 2024-11-27 | End: 2024-12-02

## 2024-11-27 ASSESSMENT — ENCOUNTER SYMPTOMS
SWEATS: 0
CHILLS: 0
FEVER: 0
WHEEZING: 1
SHORTNESS OF BREATH: 1
HEADACHES: 0
HEARTBURN: 1
HEMOPTYSIS: 0
WEIGHT LOSS: 0
MYALGIAS: 0
COUGH: 1
RHINORRHEA: 0
SORE THROAT: 1

## 2024-11-27 NOTE — PROGRESS NOTES
Subjective   Patient ID: Humphrey Villarreal is a 44 y.o. male who presents for Cough (X 1 week. Neg for covid This am. ).  I performed this visit using real-time telehealth tools, including an audio/video connection between Humphrey Villarreal location: [place]  and Katherine Nieto DO location : [place]    Cough  This is a recurrent problem. The current episode started in the past 7 days. The problem has been rapidly worsening. The problem occurs every few minutes. The cough is Non-productive. Associated symptoms include chest pain, heartburn, a sore throat, shortness of breath and wheezing. Pertinent negatives include no chills, ear congestion, ear pain, fever, headaches, hemoptysis, myalgias, nasal congestion, postnasal drip, rash, rhinorrhea, sweats or weight loss. The symptoms are aggravated by cold air, dust, lying down and pollens.    Patient started getting sick last week. Sinus pressure congestion drainage. No fever, chills body aches. He is having dry cough all day all night.     Review of Systems   Constitutional:  Negative for chills, fever and weight loss.   HENT:  Positive for sore throat. Negative for ear pain, postnasal drip and rhinorrhea.    Respiratory:  Positive for cough, shortness of breath and wheezing. Negative for hemoptysis.    Cardiovascular:  Positive for chest pain.   Gastrointestinal:  Positive for heartburn.   Musculoskeletal:  Negative for myalgias.   Skin:  Negative for rash.   Neurological:  Negative for headaches.       Objective   There were no vitals taken for this visit.  BSA There is no height or weight on file to calculate BSA.      Physical Exam  Constitutional:       Appearance: Normal appearance.   Neurological:      Mental Status: He is alert.       Office Visit on 01/15/2024   Component Date Value Ref Range Status    RSV PCR 01/15/2024 Not Detected  Not Detected Final    Flu A Result 01/15/2024 Not Detected  Not Detected Final    Flu B Result 01/15/2024 Not Detected  Not Detected Final     Coronavirus 2019, PCR 01/15/2024 Not Detected  Not Detected Final     Current Outpatient Medications on File Prior to Visit   Medication Sig Dispense Refill    albuterol 90 mcg/actuation inhaler Inhale 2 puffs every 6 hours if needed for wheezing. 1 g 0    benzonatate (Tessalon) 200 mg capsule Take 1 capsule (200 mg) by mouth 3 times a day as needed for cough for up to 7 days. Do not crush or chew. 21 capsule 0    esomeprazole (NexIUM) 40 mg DR capsule Take 1 capsule (40 mg) by mouth once daily in the morning. Take before meals. Do not open capsule.      fluticasone (Flonase) 50 mcg/actuation nasal spray Administer 2 sprays into each nostril once daily. Shake gently. Before first use, prime pump. After use, clean tip and replace cap. 16 g 1    metoprolol succinate XL (Toprol-XL) 100 mg 24 hr tablet Take 1 tablet (100 mg) by mouth once daily. 90 tablet 1    semaglutide, weight loss, (Wegovy) 1 mg/0.5 mL pen injector INJECT 1 MG UNDER THE SKIN 1 (ONE) TIME PER WEEK. 0.5 mL 5    triamterene-hydrochlorothiazid (Maxzide-25) 37.5-25 mg tablet TAKE ONE TABLET BY MOUTH IN THE MORNING ONCE DAILY 90 tablet 1    valsartan (Diovan) 160 mg tablet Take 1 tablet (160 mg) by mouth once daily. 90 tablet 1     No current facility-administered medications on file prior to visit.     No images are attached to the encounter.          Assessment/Plan   Diagnoses and all orders for this visit:  Acute bronchitis, unspecified organism  -     XR chest 2 views; Future  -     azithromycin (Zithromax) 250 mg tablet; Take 2 tablets (500 mg) by mouth once daily for 1 day, THEN 1 tablet (250 mg) once daily for 4 days. Take 2 tabs (500 mg) by mouth today, than 1 daily for 4 days..    Patient to start on antibiotics  Patient to have CXR  Patient to call if questions or concerns

## 2024-12-06 ENCOUNTER — TELEMEDICINE (OUTPATIENT)
Dept: PHARMACY | Facility: HOSPITAL | Age: 44
End: 2024-12-06
Payer: COMMERCIAL

## 2024-12-06 RX ORDER — SEMAGLUTIDE 1.7 MG/.75ML
1.7 INJECTION, SOLUTION SUBCUTANEOUS WEEKLY
Qty: 9 ML | Refills: 3 | Status: SHIPPED | OUTPATIENT
Start: 2024-12-06

## 2024-12-06 NOTE — PROGRESS NOTES
"Subjective   Patient ID: Humphrey Villarreal is a 44 y.o. male who presents for Weight Loss.    Referring Provider: DO TICO Saha  Patient reports difficulty maintaining or losing weight with diet, exercise and has made multiple attempts to lose weight before. He identifies that a medication to help accomplish lifestyle changes would be helpful to his weight loss.     Review of Systems    Medication System Management:  Affordability/Accessibility: no issues identified  Adherence/Organization: excellent  Adverse Effects: none, is aware of strategies to mitigate nausea and GERD, no constant issues.       Objective     There were no vitals taken for this visit.     Labs  Lab Results   Component Value Date    BILITOT 0.6 2023    CALCIUM 9.2 2023    CO2 30 2023     2023    CREATININE 0.85 2023    GLUCOSE 110 (H) 2023    ALKPHOS 47 2023    K 3.7 2023    PROT 6.7 2023     2023    AST 26 2023    ALT 42 2023    BUN 16 2023    ANIONGAP 13 2023    ALBUMIN 4.5 2023    AMYLASE 26 (L) 2023    LIPASE 28 2023    GFRMALE >90 2023     Lab Results   Component Value Date    TRIG 287 (H) 2023    CHOL 145 2023    HDL 32.0 (A) 2023     No results found for: \"HGBA1C\"    Current Outpatient Medications on File Prior to Visit   Medication Sig Dispense Refill    albuterol 90 mcg/actuation inhaler Inhale 2 puffs every 6 hours if needed for wheezing. 1 g 0    [] azithromycin (Zithromax) 250 mg tablet Take 2 tablets (500 mg) by mouth once daily for 1 day, THEN 1 tablet (250 mg) once daily for 4 days. Take 2 tabs (500 mg) by mouth today, than 1 daily for 4 days.. 6 tablet 0    [] benzonatate (Tessalon) 200 mg capsule Take 1 capsule (200 mg) by mouth 3 times a day as needed for cough for up to 7 days. Do not crush or chew. 21 capsule 0    esomeprazole (NexIUM) 40 mg DR capsule Take 1 " capsule (40 mg) by mouth once daily in the morning. Take before meals. Do not open capsule.      fluticasone (Flonase) 50 mcg/actuation nasal spray Administer 2 sprays into each nostril once daily. Shake gently. Before first use, prime pump. After use, clean tip and replace cap. 16 g 1    metoprolol succinate XL (Toprol-XL) 100 mg 24 hr tablet Take 1 tablet (100 mg) by mouth once daily. 90 tablet 1    triamterene-hydrochlorothiazid (Maxzide-25) 37.5-25 mg tablet TAKE ONE TABLET BY MOUTH IN THE MORNING ONCE DAILY 90 tablet 1    valsartan (Diovan) 160 mg tablet Take 1 tablet (160 mg) by mouth once daily. 90 tablet 1    [DISCONTINUED] semaglutide, weight loss, (Wegovy) 1 mg/0.5 mL pen injector INJECT 1 MG UNDER THE SKIN 1 (ONE) TIME PER WEEK. 0.5 mL 5     No current facility-administered medications on file prior to visit.        Assessment/Plan   Problem List Items Addressed This Visit       Adult BMI 34.0-34.9 kg/sq m    Relevant Medications    semaglutide, weight loss, (Wegovy) 1.7 mg/0.75 mL pen injector    Other Relevant Orders    Referral to Clinical Pharmacy       ASSESSMENT:  Reported weight at last visit (10/28/24): 236-239 lbs  Current weight: 235  Adverse Effects: none reported    Patient reports tolerating the medication well with no adverse effects of nausea, vomiting, constipation. Some indigestion treated with PRN Tums in addition to daily PPI.     PLAN:  Increase to Wegovy 1.7 mg subcutaneously weekly   Counseled to avoid greasy/fatty foods, focus on portion size and eating more slowly.     Follow up: 1/3/24 @ 7659      Brendon Perry, PharmD    Continue all meds under the continuation of care with the referring provider and clinical pharmacy team.

## 2025-01-02 NOTE — PROGRESS NOTES
"Subjective   Patient ID: Humphrey Villarreal is a 44 y.o. male who presents for Weight Loss.    Referring Provider: DO TICO Saha  Patient reports difficulty maintaining or losing weight with diet, exercise and has made multiple attempts to lose weight before. He identifies that a medication to help accomplish lifestyle changes would be helpful to his weight loss.     Review of Systems    Medication System Management:  Affordability/Accessibility: no issues identified  Adherence/Organization: excellent  Adverse Effects: none, is aware of strategies to mitigate nausea and GERD, no constant issues.       Objective     There were no vitals taken for this visit.     Labs  Lab Results   Component Value Date    BILITOT 0.6 08/03/2023    CALCIUM 9.2 08/03/2023    CO2 30 08/03/2023     08/03/2023    CREATININE 0.85 08/03/2023    GLUCOSE 110 (H) 08/03/2023    ALKPHOS 47 08/03/2023    K 3.7 08/03/2023    PROT 6.7 08/03/2023     08/03/2023    AST 26 08/03/2023    ALT 42 08/03/2023    BUN 16 08/03/2023    ANIONGAP 13 08/03/2023    ALBUMIN 4.5 08/03/2023    AMYLASE 26 (L) 08/03/2023    LIPASE 28 08/03/2023    GFRMALE >90 08/03/2023     Lab Results   Component Value Date    TRIG 287 (H) 08/03/2023    CHOL 145 08/03/2023    HDL 32.0 (A) 08/03/2023     No results found for: \"HGBA1C\"    Current Outpatient Medications on File Prior to Visit   Medication Sig Dispense Refill   • albuterol 90 mcg/actuation inhaler Inhale 2 puffs every 6 hours if needed for wheezing. 1 g 0   • esomeprazole (NexIUM) 40 mg DR capsule Take 1 capsule (40 mg) by mouth once daily in the morning. Take before meals. Do not open capsule.     • fluticasone (Flonase) 50 mcg/actuation nasal spray Administer 2 sprays into each nostril once daily. Shake gently. Before first use, prime pump. After use, clean tip and replace cap. 16 g 1   • metoprolol succinate XL (Toprol-XL) 100 mg 24 hr tablet Take 1 tablet (100 mg) by mouth once daily. 90 tablet " 1   • semaglutide, weight loss, (Wegovy) 1.7 mg/0.75 mL pen injector Inject 1.7 mg under the skin 1 (one) time per week. 9 mL 3   • triamterene-hydrochlorothiazid (Maxzide-25) 37.5-25 mg tablet TAKE ONE TABLET BY MOUTH IN THE MORNING ONCE DAILY 90 tablet 1   • valsartan (Diovan) 160 mg tablet Take 1 tablet (160 mg) by mouth once daily. 90 tablet 1     No current facility-administered medications on file prior to visit.        Assessment/Plan   Problem List Items Addressed This Visit       Adult BMI 34.0-34.9 kg/sq m         ASSESSMENT:  Reported weight at last visit (10/28/24): 236-239 lbs  Current weight: 235  Adverse Effects: none reported    Patient reports tolerating the medication well with no adverse effects of nausea, vomiting, constipation. Some indigestion treated with PRN Tums in addition to daily PPI.     PLAN:  Continue Wegovy 1.7 mg subcutaneously weekly   Counseled to avoid greasy/fatty foods, focus on portion size and eating more slowly.     Follow up: 2/28/25 @ 1400      Brendon Perry, PharmD    Continue all meds under the continuation of care with the referring provider and clinical pharmacy team.

## 2025-01-03 ENCOUNTER — APPOINTMENT (OUTPATIENT)
Dept: PHARMACY | Facility: HOSPITAL | Age: 45
End: 2025-01-03
Payer: COMMERCIAL

## 2025-01-03 ENCOUNTER — TELEMEDICINE (OUTPATIENT)
Dept: PHARMACY | Facility: HOSPITAL | Age: 45
End: 2025-01-03
Payer: COMMERCIAL

## 2025-02-28 ENCOUNTER — APPOINTMENT (OUTPATIENT)
Dept: PHARMACY | Facility: HOSPITAL | Age: 45
End: 2025-02-28
Payer: COMMERCIAL

## 2025-02-28 NOTE — PROGRESS NOTES
"Subjective   Patient ID: Humphrey Villarreal is a 44 y.o. male who presents for No chief complaint on file..    Referring Provider: DO TICO Saha  Patient reports difficulty maintaining or losing weight with diet, exercise and has made multiple attempts to lose weight before. He identifies that a medication to help accomplish lifestyle changes would be helpful to his weight loss.     Review of Systems    Medication System Management:  Affordability/Accessibility: no issues identified  Adherence/Organization: excellent  Adverse Effects: none, is aware of strategies to mitigate nausea and GERD, no constant issues.       Objective     There were no vitals taken for this visit.     Labs  Lab Results   Component Value Date    BILITOT 0.6 08/03/2023    CALCIUM 9.2 08/03/2023    CO2 30 08/03/2023     08/03/2023    CREATININE 0.85 08/03/2023    GLUCOSE 110 (H) 08/03/2023    ALKPHOS 47 08/03/2023    K 3.7 08/03/2023    PROT 6.7 08/03/2023     08/03/2023    AST 26 08/03/2023    ALT 42 08/03/2023    BUN 16 08/03/2023    ANIONGAP 13 08/03/2023    ALBUMIN 4.5 08/03/2023    AMYLASE 26 (L) 08/03/2023    LIPASE 28 08/03/2023    GFRMALE >90 08/03/2023     Lab Results   Component Value Date    TRIG 287 (H) 08/03/2023    CHOL 145 08/03/2023    HDL 32.0 (A) 08/03/2023     No results found for: \"HGBA1C\"    Current Outpatient Medications on File Prior to Visit   Medication Sig Dispense Refill    albuterol 90 mcg/actuation inhaler Inhale 2 puffs every 6 hours if needed for wheezing. 1 g 0    esomeprazole (NexIUM) 40 mg DR capsule Take 1 capsule (40 mg) by mouth once daily in the morning. Take before meals. Do not open capsule.      fluticasone (Flonase) 50 mcg/actuation nasal spray Administer 2 sprays into each nostril once daily. Shake gently. Before first use, prime pump. After use, clean tip and replace cap. 16 g 1    metoprolol succinate XL (Toprol-XL) 100 mg 24 hr tablet Take 1 tablet (100 mg) by mouth once " daily. 90 tablet 1    semaglutide, weight loss, (Wegovy) 1.7 mg/0.75 mL pen injector Inject 1.7 mg under the skin 1 (one) time per week. 9 mL 3    triamterene-hydrochlorothiazid (Maxzide-25) 37.5-25 mg tablet TAKE ONE TABLET BY MOUTH IN THE MORNING ONCE DAILY 90 tablet 1    valsartan (Diovan) 160 mg tablet Take 1 tablet (160 mg) by mouth once daily. 90 tablet 1     No current facility-administered medications on file prior to visit.        Assessment/Plan   Problem List Items Addressed This Visit       Adult BMI 34.0-34.9 kg/sq m    Relevant Orders    Referral to Clinical Pharmacy       ASSESSMENT:  Reported weight at last visit (10/28/24): 236-239 lbs  Current weight: 226  Adverse Effects: none reported    Patient reports tolerating the medication well with no adverse effects of nausea, vomiting, constipation. Some indigestion treated with PRN Tums in addition to daily PPI.     Reports focusing on eating more slowly and noticing improved fullness.     PLAN:  Continue Wegovy 1.7 mg subcutaneously weekly    Counseled to avoid greasy/fatty foods, focus on portion size and eating more slowly.     Follow up: 5/22/25 @ 1400      Brendon Perry, [place]ila    Continue all meds under the continuation of care with the referring provider and clinical pharmacy team.

## 2025-04-10 ENCOUNTER — TELEPHONE (OUTPATIENT)
Dept: PRIMARY CARE | Facility: CLINIC | Age: 45
End: 2025-04-10
Payer: COMMERCIAL

## 2025-04-10 PROCEDURE — RXMED WILLOW AMBULATORY MEDICATION CHARGE

## 2025-04-14 ENCOUNTER — PHARMACY VISIT (OUTPATIENT)
Dept: PHARMACY | Facility: CLINIC | Age: 45
End: 2025-04-14
Payer: MEDICARE

## 2025-04-22 ENCOUNTER — TELEPHONE (OUTPATIENT)
Dept: PRIMARY CARE | Facility: CLINIC | Age: 45
End: 2025-04-22
Payer: COMMERCIAL

## 2025-04-26 ASSESSMENT — PROMIS GLOBAL HEALTH SCALE
RATE_AVERAGE_FATIGUE: MODERATE
CARRYOUT_SOCIAL_ACTIVITIES: VERY GOOD
RATE_QUALITY_OF_LIFE: GOOD
RATE_SOCIAL_SATISFACTION: VERY GOOD
RATE_AVERAGE_PAIN: 4
EMOTIONAL_PROBLEMS: SOMETIMES
RATE_PHYSICAL_HEALTH: GOOD
CARRYOUT_PHYSICAL_ACTIVITIES: MOSTLY
RATE_GENERAL_HEALTH: GOOD
RATE_MENTAL_HEALTH: GOOD

## 2025-05-02 ENCOUNTER — APPOINTMENT (OUTPATIENT)
Dept: PRIMARY CARE | Facility: CLINIC | Age: 45
End: 2025-05-02
Payer: COMMERCIAL

## 2025-05-02 VITALS
SYSTOLIC BLOOD PRESSURE: 134 MMHG | DIASTOLIC BLOOD PRESSURE: 80 MMHG | HEIGHT: 72 IN | HEART RATE: 98 BPM | BODY MASS INDEX: 31.31 KG/M2 | WEIGHT: 231.2 LBS

## 2025-05-02 DIAGNOSIS — J45.909 ASTHMA, UNSPECIFIED ASTHMA SEVERITY, UNSPECIFIED WHETHER COMPLICATED, UNSPECIFIED WHETHER PERSISTENT (HHS-HCC): Primary | ICD-10-CM

## 2025-05-02 DIAGNOSIS — J30.9 ALLERGIC RHINITIS, UNSPECIFIED SEASONALITY, UNSPECIFIED TRIGGER: ICD-10-CM

## 2025-05-02 RX ORDER — TRIAMCINOLONE ACETONIDE 40 MG/ML
80 INJECTION, SUSPENSION INTRA-ARTICULAR; INTRAMUSCULAR ONCE
Status: COMPLETED | OUTPATIENT
Start: 2025-05-02 | End: 2025-05-02

## 2025-05-02 RX ADMIN — TRIAMCINOLONE ACETONIDE 80 MG: 40 INJECTION, SUSPENSION INTRA-ARTICULAR; INTRAMUSCULAR at 11:59

## 2025-05-02 ASSESSMENT — ENCOUNTER SYMPTOMS
SINUS PRESSURE: 0
ACTIVITY CHANGE: 0
FACIAL SWELLING: 0
APPETITE CHANGE: 0
EYE DISCHARGE: 0
COUGH: 0
ARTHRALGIAS: 0
CONSTIPATION: 0
DIARRHEA: 0
CHILLS: 0
OCCASIONAL FEELINGS OF UNSTEADINESS: 0
LOSS OF SENSATION IN FEET: 0
COLOR CHANGE: 0
PALPITATIONS: 0
CONFUSION: 0
DEPRESSION: 0
WHEEZING: 0
FEVER: 0
RHINORRHEA: 1

## 2025-05-02 ASSESSMENT — PATIENT HEALTH QUESTIONNAIRE - PHQ9
SUM OF ALL RESPONSES TO PHQ9 QUESTIONS 1 AND 2: 0
1. LITTLE INTEREST OR PLEASURE IN DOING THINGS: NOT AT ALL
2. FEELING DOWN, DEPRESSED OR HOPELESS: NOT AT ALL
10. IF YOU CHECKED OFF ANY PROBLEMS, HOW DIFFICULT HAVE THESE PROBLEMS MADE IT FOR YOU TO DO YOUR WORK, TAKE CARE OF THINGS AT HOME, OR GET ALONG WITH OTHER PEOPLE: NOT DIFFICULT AT ALL

## 2025-05-02 NOTE — PROGRESS NOTES
Subjective   Patient ID: Humphrey Villarreal is a 44 y.o. male who presents for Annual Exam.    HPI   Patient comes in stating that he has had difficulty with his allergies. He has grass, ragweed, dust, cats, dogs and most recently tree pollen.     Review of Systems   Constitutional:  Negative for activity change, appetite change, chills and fever.   HENT:  Positive for rhinorrhea and sneezing. Negative for congestion, ear pain, facial swelling and sinus pressure.    Eyes:  Negative for discharge.   Respiratory:  Negative for cough and wheezing.    Cardiovascular:  Negative for chest pain, palpitations and leg swelling.   Gastrointestinal:  Negative for constipation and diarrhea.   Musculoskeletal:  Negative for arthralgias.   Skin:  Negative for color change and pallor.   Neurological:  Negative for syncope.   Psychiatric/Behavioral:  Negative for confusion.        Objective   /80 (BP Location: Left arm, Patient Position: Sitting)   Pulse 98   Ht 1.829 m (6')   Wt 105 kg (231 lb 3.2 oz)   BMI 31.36 kg/m²   BSA Body surface area is 2.31 meters squared.      Physical Exam  Constitutional:       General: He is not in acute distress.     Appearance: Normal appearance. He is not toxic-appearing.   HENT:      Head: Normocephalic.      Right Ear: Tympanic membrane, ear canal and external ear normal.      Left Ear: Tympanic membrane, ear canal and external ear normal.   Eyes:      Conjunctiva/sclera: Conjunctivae normal.      Pupils: Pupils are equal, round, and reactive to light.   Cardiovascular:      Rate and Rhythm: Normal rate and regular rhythm.      Pulses: Normal pulses.      Heart sounds: Normal heart sounds.   Pulmonary:      Effort: No respiratory distress.      Breath sounds: No wheezing, rhonchi or rales.   Musculoskeletal:         General: No swelling or tenderness.   Skin:     Findings: No lesion or rash.   Neurological:      General: No focal deficit present.      Mental Status: He is alert and  oriented to person, place, and time. Mental status is at baseline.      Gait: Gait normal.   Psychiatric:         Mood and Affect: Mood normal.         Behavior: Behavior normal.         Thought Content: Thought content normal.         Judgment: Judgment normal.       No visits with results within 1 Year(s) from this visit.   Latest known visit with results is:   Office Visit on 01/15/2024   Component Date Value Ref Range Status    RSV PCR 01/15/2024 Not Detected  Not Detected Final    Flu A Result 01/15/2024 Not Detected  Not Detected Final    Flu B Result 01/15/2024 Not Detected  Not Detected Final    Coronavirus 2019, PCR 01/15/2024 Not Detected  Not Detected Final     Current Outpatient Medications on File Prior to Visit   Medication Sig Dispense Refill    esomeprazole (NexIUM) 40 mg DR capsule Take 1 capsule (40 mg) by mouth once daily in the morning. Take before meals. Do not open capsule.      fluticasone (Flonase) 50 mcg/actuation nasal spray Administer 2 sprays into each nostril once daily. Shake gently. Before first use, prime pump. After use, clean tip and replace cap. 16 g 1    metoprolol succinate XL (Toprol-XL) 100 mg 24 hr tablet Take 1 tablet (100 mg) by mouth once daily. 90 tablet 1    semaglutide, weight loss, (Wegovy) 1.7 mg/0.75 mL pen injector Inject 1.7 mg under the skin 1 (one) time per week. 9 mL 3    triamterene-hydrochlorothiazid (Maxzide-25) 37.5-25 mg tablet TAKE ONE TABLET BY MOUTH IN THE MORNING ONCE DAILY 90 tablet 1    valsartan (Diovan) 160 mg tablet Take 1 tablet (160 mg) by mouth once daily. 90 tablet 1    [DISCONTINUED] albuterol 90 mcg/actuation inhaler Inhale 2 puffs every 6 hours if needed for wheezing. 1 g 0     No current facility-administered medications on file prior to visit.     No images are attached to the encounter.       Assessment/Plan   Diagnoses and all orders for this visit:  Asthma, unspecified asthma severity, unspecified whether complicated, unspecified whether  persistent ([facility]-HCC)  Allergic rhinitis, unspecified seasonality, unspecified trigger    Patient to continue on current medication  Patient to call if questions or concerns

## 2025-05-05 PROCEDURE — RXMED WILLOW AMBULATORY MEDICATION CHARGE

## 2025-05-06 ENCOUNTER — TELEPHONE (OUTPATIENT)
Dept: PRIMARY CARE | Facility: CLINIC | Age: 45
End: 2025-05-06
Payer: COMMERCIAL

## 2025-05-06 NOTE — TELEPHONE ENCOUNTER
Fax request from [facility] for    valsartan (Diovan) 160 mg tablet    Take 1 tablet (160 mg) by mouth once daily.   Quantity: 90 tablet Refills: 1     triamterene-hydrochlorothiazid (Maxzide-25) 37.5-25 mg tablet   TAKE ONE TABLET BY MOUTH IN THE MORNING ONCE DAILY   Quantity: 90 tablet Refills: 1     metoprolol succinate XL (Toprol-XL) 100 mg 24 hr tablet    Take 1 tablet (100 mg) by mouth once daily.    Quantity: 90 tablet Refills: 1     [facility]/pharmacy #[id number] - [place], [place] - [address] AT [facility] [place]  Phone: [phone]   Fax: [phone]

## 2025-05-08 DIAGNOSIS — I10 PRIMARY HYPERTENSION: ICD-10-CM

## 2025-05-08 RX ORDER — VALSARTAN 160 MG/1
160 TABLET ORAL DAILY
Qty: 90 TABLET | Refills: 1 | Status: SHIPPED | OUTPATIENT
Start: 2025-05-08

## 2025-05-08 RX ORDER — TRIAMTERENE/HYDROCHLOROTHIAZID 37.5-25 MG
TABLET ORAL
Qty: 90 TABLET | Refills: 1 | Status: SHIPPED | OUTPATIENT
Start: 2025-05-08

## 2025-05-08 RX ORDER — METOPROLOL SUCCINATE 100 MG/1
100 TABLET, EXTENDED RELEASE ORAL DAILY
Qty: 90 TABLET | Refills: 1 | Status: SHIPPED | OUTPATIENT
Start: 2025-05-08

## 2025-05-09 ENCOUNTER — PHARMACY VISIT (OUTPATIENT)
Dept: PHARMACY | Facility: CLINIC | Age: 45
End: 2025-05-09
Payer: MEDICARE

## 2025-05-22 ENCOUNTER — APPOINTMENT (OUTPATIENT)
Dept: PHARMACY | Facility: HOSPITAL | Age: 45
End: 2025-05-22
Payer: COMMERCIAL

## 2025-05-28 ENCOUNTER — APPOINTMENT (OUTPATIENT)
Dept: PHARMACY | Facility: HOSPITAL | Age: 45
End: 2025-05-28
Payer: COMMERCIAL

## 2025-05-28 NOTE — PROGRESS NOTES
"Subjective   Patient ID: Humphrey Villarreal is a 44 y.o. male who presents for Weight Loss.    Referring Provider: DO TICO Saha  Patient reports difficulty maintaining or losing weight with diet, exercise and has made multiple attempts to lose weight before. He identifies that a medication to help accomplish lifestyle changes would be helpful to his weight loss.     Review of Systems    Medication System Management:  Affordability/Accessibility: no issues identified  Adherence/Organization: excellent  Adverse Effects: none, is aware of strategies to mitigate nausea and GERD, no constant issues.       Objective     There were no vitals taken for this visit.     Labs  Lab Results   Component Value Date    BILITOT 0.6 08/03/2023    CALCIUM 9.2 08/03/2023    CO2 30 08/03/2023     08/03/2023    CREATININE 0.85 08/03/2023    GLUCOSE 110 (H) 08/03/2023    ALKPHOS 47 08/03/2023    K 3.7 08/03/2023    PROT 6.7 08/03/2023     08/03/2023    AST 26 08/03/2023    ALT 42 08/03/2023    BUN 16 08/03/2023    ANIONGAP 13 08/03/2023    ALBUMIN 4.5 08/03/2023    AMYLASE 26 (L) 08/03/2023    LIPASE 28 08/03/2023    GFRMALE >90 08/03/2023     Lab Results   Component Value Date    TRIG 287 (H) 08/03/2023    CHOL 145 08/03/2023    HDL 32.0 (A) 08/03/2023     No results found for: \"HGBA1C\"    Current Outpatient Medications on File Prior to Visit   Medication Sig Dispense Refill    esomeprazole (NexIUM) 40 mg DR capsule Take 1 capsule (40 mg) by mouth once daily in the morning. Take before meals. Do not open capsule.      fluticasone (Flonase) 50 mcg/actuation nasal spray Administer 2 sprays into each nostril once daily. Shake gently. Before first use, prime pump. After use, clean tip and replace cap. 16 g 1    metoprolol succinate XL (Toprol-XL) 100 mg 24 hr tablet Take 1 tablet (100 mg) by mouth once daily. 90 tablet 1    semaglutide, weight loss, (Wegovy) 1.7 mg/0.75 mL pen injector Inject 1.7 mg under the skin 1 " (one) time per week. 9 mL 3    triamterene-hydrochlorothiazid (Maxzide-25) 37.5-25 mg tablet TAKE ONE TABLET BY MOUTH IN THE MORNING ONCE DAILY 90 tablet 1    valsartan (Diovan) 160 mg tablet Take 1 tablet (160 mg) by mouth once daily. 90 tablet 1     No current facility-administered medications on file prior to visit.        Assessment/Plan   Problem List Items Addressed This Visit       Adult BMI 34.0-34.9 kg/sq m    Relevant Orders    Referral to Clinical Pharmacy       ASSESSMENT:  Reported weight at last visit (10/28/24): 236-239 lbs  Current weight: 220  Adverse Effects: none reported    Patient reports tolerating the medication well with no adverse effects of nausea, vomiting, constipation. Some indigestion treated with PRN Tums in addition to daily PPI.     Reports focusing on eating more slowly and noticing improved fullness.     PLAN:  Continue Wegovy 1.7 mg subcutaneously weekly    Counseled to avoid greasy/fatty foods, focus on portion size and eating more slowly.     Follow up: 8/20/25 @ 33      Brendon Perry RPh    Continue all meds under the continuation of care with the referring provider and clinical pharmacy team.

## 2025-06-18 PROCEDURE — RXMED WILLOW AMBULATORY MEDICATION CHARGE

## 2025-06-20 ENCOUNTER — PHARMACY VISIT (OUTPATIENT)
Dept: PHARMACY | Facility: CLINIC | Age: 45
End: 2025-06-20
Payer: MEDICARE

## 2025-07-29 PROCEDURE — RXMED WILLOW AMBULATORY MEDICATION CHARGE

## 2025-07-30 ENCOUNTER — PHARMACY VISIT (OUTPATIENT)
Dept: PHARMACY | Facility: CLINIC | Age: 45
End: 2025-07-30
Payer: MEDICARE

## 2025-07-31 ENCOUNTER — OFFICE VISIT (OUTPATIENT)
Dept: PRIMARY CARE | Facility: CLINIC | Age: 45
End: 2025-07-31
Payer: COMMERCIAL

## 2025-07-31 VITALS
HEART RATE: 91 BPM | BODY MASS INDEX: 29.8 KG/M2 | TEMPERATURE: 98.5 F | HEIGHT: 72 IN | SYSTOLIC BLOOD PRESSURE: 87 MMHG | DIASTOLIC BLOOD PRESSURE: 59 MMHG | OXYGEN SATURATION: 97 % | WEIGHT: 220 LBS

## 2025-07-31 DIAGNOSIS — I95.1 ORTHOSTATIC HYPOTENSION: ICD-10-CM

## 2025-07-31 DIAGNOSIS — R10.84 GENERALIZED ABDOMINAL PAIN: Primary | ICD-10-CM

## 2025-07-31 DIAGNOSIS — K92.1 STOOL COLOR BLACK: ICD-10-CM

## 2025-07-31 DIAGNOSIS — R19.7 DIARRHEA, UNSPECIFIED TYPE: ICD-10-CM

## 2025-07-31 DIAGNOSIS — R00.0 TACHYCARDIA: ICD-10-CM

## 2025-07-31 PROBLEM — J06.9 VIRAL URI: Status: RESOLVED | Noted: 2024-01-15 | Resolved: 2025-07-31

## 2025-07-31 PROBLEM — K76.0 STEATOSIS OF LIVER: Status: ACTIVE | Noted: 2025-07-31

## 2025-07-31 PROBLEM — G44.209 TENSION HEADACHE: Status: ACTIVE | Noted: 2025-07-31

## 2025-07-31 PROCEDURE — 3008F BODY MASS INDEX DOCD: CPT | Performed by: FAMILY MEDICINE

## 2025-07-31 PROCEDURE — 99215 OFFICE O/P EST HI 40 MIN: CPT | Performed by: FAMILY MEDICINE

## 2025-07-31 ASSESSMENT — ENCOUNTER SYMPTOMS
FEVER: 1
FLATUS: 0
MYALGIAS: 0
DIARRHEA: 1
HEMATOCHEZIA: 0
LOSS OF SENSATION IN FEET: 0
ANOREXIA: 1
NAUSEA: 1
DEPRESSION: 0
WEIGHT LOSS: 0
CONSTIPATION: 1
HEMATURIA: 0
HEADACHES: 1
OCCASIONAL FEELINGS OF UNSTEADINESS: 0
BELCHING: 1
DYSURIA: 1
ABDOMINAL PAIN: 1
VOMITING: 0
ARTHRALGIAS: 0
FREQUENCY: 0

## 2025-07-31 ASSESSMENT — PATIENT HEALTH QUESTIONNAIRE - PHQ9
SUM OF ALL RESPONSES TO PHQ9 QUESTIONS 1 AND 2: 0
2. FEELING DOWN, DEPRESSED OR HOPELESS: NOT AT ALL
1. LITTLE INTEREST OR PLEASURE IN DOING THINGS: NOT AT ALL

## 2025-07-31 NOTE — PROGRESS NOTES
Subjective   Patient ID: Humphrey Villarreal is a 45 y.o. male who presents for Diarrhea (Started last week. ).  History of Present Illness  Subjective   Humphrey Villarreal is a 45 y.o. male who is referred for evaluation of abdominal pain. The pain is located in the epigastrium, in the LLQ, and in the lower abdomen. The pain is described as aching and cramping, and is 7/10 in intensity. Onset was 5 days ago. Symptoms have been unchanged since. Aggravating factors include: none. Alleviating factors include: none. Associated symptoms include: anorexia, belching, chills, constipation, diarrhea, melena, and nausea. The patient denies arthralgias, hematuria, and sweats.    Objective   Physical Exam    Lab Results   Component Value Date    WBC 5.1 08/03/2023    RBC 5.00 08/03/2023    HGB 15.8 08/03/2023    HCT 47.4 08/03/2023     (L) 08/03/2023     Lab Results   Component Value Date    AMYLASE 26 (L) 08/03/2023     Lab Results   Component Value Date    LIPASE 28 08/03/2023       Assessment/Plan   Abdominal pain, likely secondary to pancreatitis vs diverticulitis ve GI bleed .     Referred to the ER and he decided to drive himself.   The risks and benefits of my recommendations, as well as other treatment options were discussed with the patient today. Questions were answered.    Review of Systems   Constitutional:  Positive for fever.   Gastrointestinal:  Positive for abdominal pain, constipation, diarrhea and nausea. Negative for vomiting.   Genitourinary:  Positive for dysuria. Negative for frequency and hematuria.   Musculoskeletal:  Negative for arthralgias and myalgias.   Neurological:  Positive for headaches.     ROS otherwise negative aside from what was mentioned above in HPI.  Answers submitted by the patient for this visit:  Abdominal Pain Questionnaire (Submitted on 7/31/2025)  Chief Complaint: Abdominal pain  Chronicity: recurrent  Onset: in the past 7 days  Onset quality: gradual  Frequency:  constantly  Episode duration: 6 Days  Progression since onset: gradually worsening  Pain location: LLQ, LUQ, RLQ, RUQ, epigastric region, generalized abdominal region, suprapubic region, left flank, right flank  Pain - numeric: 8/10  Pain quality: burning, cramping, a sensation of fullness, sharp, tearing  Radiates to: does not radiate  anorexia: Yes  belching: Yes  flatus: No  hematochezia: No  melena: Yes  weight loss: No  Aggravated by: certain positions, coughing, deep breathing, eating, movement  Relieved by: nothing    Objective     BP 87/59   Pulse 91   Temp 36.9 °C (98.5 °F) (Oral)   Ht 1.829 m (6')   Wt 99.8 kg (220 lb)   SpO2 97%   BMI 29.84 kg/m²      Physical Exam  Physical Exam  Vitals and nursing note reviewed.   Constitutional:       General: She is not in acute distress.     Appearance: Normal appearance. She is not ill-appearing, toxic-appearing or diaphoretic.   HENT:      Head: Normocephalic and atraumatic.      Right Ear: Tympanic membrane, ear canal and external ear normal. There is no impacted cerumen.      Left Ear: Tympanic membrane, ear canal and external ear normal. There is no impacted cerumen.      Nose: No congestion or rhinorrhea.      Mouth/Throat:      Mouth: Mucous membranes are moist.      Pharynx: Oropharynx is clear. No oropharyngeal exudate or posterior oropharyngeal erythema.   Eyes:      General: No scleral icterus.        Right eye: No discharge.         Left eye: No discharge.      Extraocular Movements: Extraocular movements intact.      Conjunctiva/sclera: Conjunctivae normal.      Pupils: Pupils are equal, round, and reactive to light.   Neck:      Vascular: No carotid bruit.   Cardiovascular:      Rate and Rhythm: Normal rate and regular rhythm.      Pulses: Normal pulses.      Heart sounds: Normal heart sounds. No murmur heard.     No friction rub. No gallop.   Pulmonary:      Effort: Pulmonary effort is normal. No respiratory distress.      Breath sounds: Normal  breath sounds. No stridor. No wheezing, rhonchi or rales.   Chest:      Chest wall: No tenderness.   Musculoskeletal:         General: Normal range of motion.      Cervical back: Normal range of motion and neck supple. No rigidity or tenderness.      Right lower leg: No edema.      Left lower leg: No edema.   Lymphadenopathy:      Cervical: No cervical adenopathy.   Skin:     General: Skin is warm and dry.   Neurological:      General: No focal deficit present.      Mental Status: She is alert and oriented to person, place, and time.   Psychiatric:         Mood and Affect: Mood normal.         Behavior: Behavior normal.         Thought Content: Thought content normal.         Judgment: Judgment normal.   GI   Tenderness throughout the abdomen with mild touch and positive rebound and guarding.       Assessment & Plan      Assessment & Plan  Generalized abdominal pain  Sent to the ER       Diarrhea, unspecified type         Stool color black  Concern for GI bleed       Tachycardia  Needs IV hydration       Orthostatic hypotension  Needs IV hydration         Current Outpatient Medications   Medication Instructions    esomeprazole (NEXIUM) 40 mg, Daily before breakfast    fluticasone (Flonase) 50 mcg/actuation nasal spray 2 sprays, Each Nostril, Daily, Shake gently. Before first use, prime pump. After use, clean tip and replace cap.    metoprolol succinate XL (TOPROL-XL) 100 mg, oral, Daily    triamterene-hydrochlorothiazid (Maxzide-25) 37.5-25 mg tablet TAKE ONE TABLET BY MOUTH IN THE MORNING ONCE DAILY    valsartan (DIOVAN) 160 mg, oral, Daily    Wegovy 1.7 mg, subcutaneous, Weekly     Current Outpatient Medications   Medication Sig Dispense Refill    esomeprazole (NexIUM) 40 mg DR capsule Take 1 capsule (40 mg) by mouth once daily in the morning. Take before meals. Do not open capsule.      fluticasone (Flonase) 50 mcg/actuation nasal spray Administer 2 sprays into each nostril once daily. Shake gently. Before first  use, prime pump. After use, clean tip and replace cap. 16 g 1    metoprolol succinate XL (Toprol-XL) 100 mg 24 hr tablet Take 1 tablet (100 mg) by mouth once daily. 90 tablet 1    semaglutide, weight loss, (Wegovy) 1.7 mg/0.75 mL pen injector Inject 1.7 mg under the skin 1 (one) time per week. 9 mL 3    triamterene-hydrochlorothiazid (Maxzide-25) 37.5-25 mg tablet TAKE ONE TABLET BY MOUTH IN THE MORNING ONCE DAILY 90 tablet 1    valsartan (Diovan) 160 mg tablet Take 1 tablet (160 mg) by mouth once daily. 90 tablet 1     No current facility-administered medications for this visit.       Results    Lab Review  not applicable        Maida Kitchen MD     This medical note was created with the assistance of artificial intelligence (AI) for documentation purposes. The content has been reviewed and confirmed by the healthcare provider for accuracy and completeness. Patient consented to the use of audio recording and use of AI during their visit.     I personally spent over 50% of a total 40 minutes in counseling and discussion with the patient and coordination of care as described above.

## 2025-08-01 ASSESSMENT — ENCOUNTER SYMPTOMS
HEMATURIA: 0
FREQUENCY: 0
CONSTIPATION: 1
DIARRHEA: 1
MYALGIAS: 0
NAUSEA: 1
VOMITING: 0
ARTHRALGIAS: 0
HEADACHES: 1
DYSURIA: 1
FEVER: 1
ABDOMINAL PAIN: 1

## 2025-08-01 NOTE — PROGRESS NOTES
Subjective   Patient ID: Humphrey Villarreal is a 45 y.o. male who presents for Diarrhea (Started last week. ).  History of Present Illness  Subjective   Humphrey Villarreal is a 45 y.o. male who is referred for evaluation of abdominal pain. The pain is located in the epigastrium, in the LLQ, and in the lower abdomen. The pain is described as aching and cramping, and is 7/10 in intensity. Onset was 5 days ago. Symptoms have been unchanged since. Aggravating factors include: none. Alleviating factors include: none. Associated symptoms include: anorexia, belching, chills, constipation, diarrhea, melena, and nausea. The patient denies arthralgias, hematuria, and sweats.  Humphrey Villarreal is a 45 year old male with gluten intolerance who presents with diarrhea and constipation.    He has experienced diarrhea for about a week, characterized by liquid black, tarry stools, and a sensation of constipation despite the diarrhea. The symptoms began after returning from a trip to [facility], where he consumed oysters and attended a golf event with a banquet, eating sushi that his wife did not consume. He experienced a low-grade fever, night sweats, and chills from Sunday night to Wednesday morning, which resolved with acetaminophen use.    He reports bloating and abdominal pain, particularly after eating, with the pain initially in the upper abdomen and now primarily in the lower abdomen. Eating substantial meals leads to indigestion and severe stomach pain in the upper abdomen. He feels bloated to the point where his stomach becomes 'hard as a rock' and experiences shortness of breath when bloated. He has been trying to eat a bland diet, including oatmeal, to manage his symptoms.    He denies nausea and vomiting but feels nauseous and belches frequently, especially upon sitting up from a lying position. He has a history of gluten intolerance and has undergone two or three endoscopies and a colonoscopy, the last one being one to two  years ago.    He has been taking Wegovy, with the last dose administered last Thursday, and notes a weight loss of eleven pounds. He has been using acetaminophen for fever but denies using anti-inflammatories like Advil or Aleve. He drinks water as much as he thinks he should and has not experienced trouble with urination.    He reports low blood pressure, which is unusual for him. No chest pressure or heaviness.    Review of Systems   Constitutional:  Positive for fever.   Gastrointestinal:  Positive for abdominal pain, constipation, diarrhea and nausea. Negative for vomiting.   Genitourinary:  Positive for dysuria. Negative for frequency and hematuria.   Musculoskeletal:  Negative for arthralgias and myalgias.   Neurological:  Positive for headaches.     ROS otherwise negative aside from what was mentioned above in HPI.  Answers submitted by the patient for this visit:  Abdominal Pain Questionnaire (Submitted on 7/31/2025)  Chief Complaint: Abdominal pain  Chronicity: recurrent  Onset: in the past 7 days  Onset quality: gradual  Frequency: constantly  Episode duration: 6 Days  Progression since onset: gradually worsening  Pain location: LLQ, LUQ, RLQ, RUQ, epigastric region, generalized abdominal region, suprapubic region, left flank, right flank  Pain - numeric: 8/10  Pain quality: burning, cramping, a sensation of fullness, sharp, tearing  Radiates to: does not radiate  anorexia: Yes  belching: Yes  flatus: No  hematochezia: No  melena: Yes  weight loss: No  Aggravated by: certain positions, coughing, deep breathing, eating, movement  Relieved by: nothing    Objective     BP 87/59   Pulse 91   Temp 36.9 °C (98.5 °F) (Oral)   Ht 1.829 m (6')   Wt 99.8 kg (220 lb)   SpO2 97%   BMI 29.84 kg/m²      Physical Exam  Physical Exam  Vitals and nursing note reviewed.   Constitutional:       General: She is not in acute distress.     Appearance: Normal appearance. She is not ill-appearing, toxic-appearing or  diaphoretic.   HENT:      Head: Normocephalic and atraumatic.      Right Ear: Tympanic membrane, ear canal and external ear normal. There is no impacted cerumen.      Left Ear: Tympanic membrane, ear canal and external ear normal. There is no impacted cerumen.      Nose: No congestion or rhinorrhea.      Mouth/Throat:      Mouth: Mucous membranes are moist.      Pharynx: Oropharynx is clear. No oropharyngeal exudate or posterior oropharyngeal erythema.   Eyes:      General: No scleral icterus.        Right eye: No discharge.         Left eye: No discharge.      Extraocular Movements: Extraocular movements intact.      Conjunctiva/sclera: Conjunctivae normal.      Pupils: Pupils are equal, round, and reactive to light.   Neck:      Vascular: No carotid bruit.   Cardiovascular:      Rate and Rhythm: Normal rate and regular rhythm.      Pulses: Normal pulses.      Heart sounds: Normal heart sounds. No murmur heard.     No friction rub. No gallop.   Pulmonary:      Effort: Pulmonary effort is normal. No respiratory distress.      Breath sounds: Normal breath sounds. No stridor. No wheezing, rhonchi or rales.   Chest:      Chest wall: No tenderness.   Musculoskeletal:         General: Normal range of motion.      Cervical back: Normal range of motion and neck supple. No rigidity or tenderness.      Right lower leg: No edema.      Left lower leg: No edema.   Lymphadenopathy:      Cervical: No cervical adenopathy.   Skin:     General: Skin is warm and dry.   Neurological:      General: No focal deficit present.      Mental Status: She is alert and oriented to person, place, and time.   Psychiatric:         Mood and Affect: Mood normal.         Behavior: Behavior normal.         Thought Content: Thought content normal.         Judgment: Judgment normal.   GI   Tenderness throughout the abdomen with mild touch and positive rebound and guarding.         GENERAL: Alert, cooperative, well developed, no acute distress  HEENT:  Normocephalic, normal oropharynx, moist mucous membranes  CHEST: Clear to auscultation bilaterally, no wheezes, rhonchi, or crackles  CARDIOVASCULAR: Normal heart rate and rhythm, S1 and S2 normal without murmurs  ABDOMEN: Tender, soft, non-distended, without organomegaly, normal bowel sounds  EXTREMITIES: No cyanosis or edema  NEUROLOGICAL: Cranial nerves grossly intact, moves all extremities without gross motor or sensory deficit    Assessment & Plan      Assessment/Plan   Abdominal pain, likely secondary to pancreatitis vs diverticulitis ve GI bleed .     Referred to the ER and he decided to drive himself.   The risks and benefits of my recommendations, as well as other treatment options were discussed with the patient today. Questions were answered.  Assessment & Plan  Generalized abdominal pain    Assessment/Plan   Abdominal pain, likely secondary to pancreatitis vs diverticulitis ve GI bleed .     Referred to the ER and he decided to drive himself.   The risks and benefits of my recommendations, as well as other treatment options were discussed with the patient today. Questions were answered.       Diarrhea, unspecified type         Stool color black         Tachycardia         Orthostatic hypotension           Current Outpatient Medications   Medication Instructions    esomeprazole (NEXIUM) 40 mg, Daily before breakfast    fluticasone (Flonase) 50 mcg/actuation nasal spray 2 sprays, Each Nostril, Daily, Shake gently. Before first use, prime pump. After use, clean tip and replace cap.    metoprolol succinate XL (TOPROL-XL) 100 mg, oral, Daily    triamterene-hydrochlorothiazid (Maxzide-25) 37.5-25 mg tablet TAKE ONE TABLET BY MOUTH IN THE MORNING ONCE DAILY    valsartan (DIOVAN) 160 mg, oral, Daily    Wegovy 1.7 mg, subcutaneous, Weekly     Current Outpatient Medications   Medication Sig Dispense Refill    esomeprazole (NexIUM) 40 mg DR capsule Take 1 capsule (40 mg) by mouth once daily in the morning. Take  before meals. Do not open capsule.      fluticasone (Flonase) 50 mcg/actuation nasal spray Administer 2 sprays into each nostril once daily. Shake gently. Before first use, prime pump. After use, clean tip and replace cap. 16 g 1    metoprolol succinate XL (Toprol-XL) 100 mg 24 hr tablet Take 1 tablet (100 mg) by mouth once daily. 90 tablet 1    semaglutide, weight loss, (Wegovy) 1.7 mg/0.75 mL pen injector Inject 1.7 mg under the skin 1 (one) time per week. 9 mL 3    triamterene-hydrochlorothiazid (Maxzide-25) 37.5-25 mg tablet TAKE ONE TABLET BY MOUTH IN THE MORNING ONCE DAILY 90 tablet 1    valsartan (Diovan) 160 mg tablet Take 1 tablet (160 mg) by mouth once daily. 90 tablet 1     No current facility-administered medications for this visit.       Results    Lab Review  not applicable        Maida Kitchen MD     This medical note was created with the assistance of artificial intelligence (AI) for documentation purposes. The content has been reviewed and confirmed by the healthcare provider for accuracy and completeness. Patient consented to the use of audio recording and use of AI during their visit.     I personally spent over 50% of a total 40 minutes in counseling and discussion with the patient and coordination of care as described above.   Answers submitted by the patient for this visit:  Abdominal Pain Questionnaire (Submitted on 7/31/2025)  Chief Complaint: Abdominal pain  Chronicity: recurrent  Onset: in the past 7 days  Onset quality: gradual  Frequency: constantly  Episode duration: 6 Days  Progression since onset: gradually worsening  Pain location: LLQ, LUQ, RLQ, RUQ, epigastric region, generalized abdominal region, suprapubic region, left flank, right flank  Pain - numeric: 8/10  Pain quality: burning, cramping, a sensation of fullness, sharp, tearing  Radiates to: does not radiate  anorexia: Yes  belching: Yes  flatus: No  hematochezia: No  melena: Yes  weight loss: No  Aggravated by: certain  positions, coughing, deep breathing, eating, movement  Relieved by: nothing

## 2025-08-20 ENCOUNTER — APPOINTMENT (OUTPATIENT)
Dept: PHARMACY | Facility: HOSPITAL | Age: 45
End: 2025-08-20
Payer: COMMERCIAL

## 2025-08-20 PROCEDURE — RXMED WILLOW AMBULATORY MEDICATION CHARGE

## 2025-08-22 ENCOUNTER — PHARMACY VISIT (OUTPATIENT)
Dept: PHARMACY | Facility: CLINIC | Age: 45
End: 2025-08-22
Payer: MEDICARE

## 2025-11-19 ENCOUNTER — APPOINTMENT (OUTPATIENT)
Dept: PHARMACY | Facility: HOSPITAL | Age: 45
End: 2025-11-19
Payer: COMMERCIAL